# Patient Record
Sex: FEMALE | Race: BLACK OR AFRICAN AMERICAN | Employment: UNEMPLOYED | URBAN - METROPOLITAN AREA
[De-identification: names, ages, dates, MRNs, and addresses within clinical notes are randomized per-mention and may not be internally consistent; named-entity substitution may affect disease eponyms.]

---

## 2017-03-31 ENCOUNTER — HOSPITAL ENCOUNTER (OUTPATIENT)
Dept: LAB | Age: 52
Discharge: HOME | End: 2017-03-31

## 2017-03-31 LAB
ALBUMIN SERPL BCP-MCNC: 3.6 G/DL (ref 3.5–5)
ALBUMIN/GLOB SERPL: 1 {RATIO} (ref 1.1–2.2)
ALP SERPL-CCNC: 117 U/L (ref 45–117)
ALT SERPL-CCNC: 17 U/L (ref 12–78)
ANION GAP BLD CALC-SCNC: 10 MMOL/L (ref 5–15)
AST SERPL W P-5'-P-CCNC: 12 U/L (ref 15–37)
BILIRUB SERPL-MCNC: 0.3 MG/DL (ref 0.2–1)
BUN SERPL-MCNC: 13 MG/DL (ref 6–20)
BUN/CREAT SERPL: 17 (ref 12–20)
CALCIUM SERPL-MCNC: 9 MG/DL (ref 8.5–10.1)
CHLORIDE SERPL-SCNC: 105 MMOL/L (ref 97–108)
CHOLEST SERPL-MCNC: 216 MG/DL
CO2 SERPL-SCNC: 27 MMOL/L (ref 21–32)
CREAT SERPL-MCNC: 0.75 MG/DL (ref 0.55–1.02)
GLOBULIN SER CALC-MCNC: 3.7 G/DL (ref 2–4)
GLUCOSE SERPL-MCNC: 105 MG/DL (ref 65–100)
HDLC SERPL-MCNC: 81 MG/DL
HDLC SERPL: 2.7 {RATIO} (ref 0–5)
LDLC SERPL CALC-MCNC: 118.2 MG/DL (ref 0–100)
LIPID PROFILE,FLP: ABNORMAL
POTASSIUM SERPL-SCNC: 4 MMOL/L (ref 3.5–5.1)
PROT SERPL-MCNC: 7.3 G/DL (ref 6.4–8.2)
SODIUM SERPL-SCNC: 142 MMOL/L (ref 136–145)
TRIGL SERPL-MCNC: 84 MG/DL (ref ?–150)
VLDLC SERPL CALC-MCNC: 16.8 MG/DL

## 2017-03-31 PROCEDURE — 80053 COMPREHEN METABOLIC PANEL: CPT | Performed by: NURSE PRACTITIONER

## 2017-03-31 PROCEDURE — 80061 LIPID PANEL: CPT | Performed by: NURSE PRACTITIONER

## 2018-09-14 ENCOUNTER — HOSPITAL ENCOUNTER (OUTPATIENT)
Dept: LAB | Age: 53
Discharge: HOME | End: 2018-09-14

## 2018-09-14 LAB
ALBUMIN SERPL-MCNC: 3.4 G/DL (ref 3.5–5)
ALBUMIN/GLOB SERPL: 0.8 {RATIO} (ref 1.1–2.2)
ALP SERPL-CCNC: 118 U/L (ref 45–117)
ALT SERPL-CCNC: 19 U/L (ref 12–78)
ANION GAP SERPL CALC-SCNC: 11 MMOL/L (ref 5–15)
AST SERPL-CCNC: 18 U/L (ref 15–37)
BILIRUB SERPL-MCNC: 0.4 MG/DL (ref 0.2–1)
BUN SERPL-MCNC: 9 MG/DL (ref 6–20)
BUN/CREAT SERPL: 11 (ref 12–20)
CALCIUM SERPL-MCNC: 8.7 MG/DL (ref 8.5–10.1)
CHLORIDE SERPL-SCNC: 105 MMOL/L (ref 97–108)
CHOLEST SERPL-MCNC: 291 MG/DL
CO2 SERPL-SCNC: 26 MMOL/L (ref 21–32)
CREAT SERPL-MCNC: 0.84 MG/DL (ref 0.55–1.02)
GLOBULIN SER CALC-MCNC: 4.1 G/DL (ref 2–4)
GLUCOSE SERPL-MCNC: 112 MG/DL (ref 65–100)
HDLC SERPL-MCNC: 70 MG/DL
HDLC SERPL: 4.2 {RATIO} (ref 0–5)
LDLC SERPL CALC-MCNC: 197 MG/DL (ref 0–100)
LIPID PROFILE,FLP: ABNORMAL
POTASSIUM SERPL-SCNC: 4.1 MMOL/L (ref 3.5–5.1)
PROT SERPL-MCNC: 7.5 G/DL (ref 6.4–8.2)
SODIUM SERPL-SCNC: 142 MMOL/L (ref 136–145)
TRIGL SERPL-MCNC: 120 MG/DL (ref ?–150)
VLDLC SERPL CALC-MCNC: 24 MG/DL

## 2018-09-14 PROCEDURE — 80061 LIPID PANEL: CPT | Performed by: NURSE PRACTITIONER

## 2018-09-14 PROCEDURE — 80053 COMPREHEN METABOLIC PANEL: CPT | Performed by: NURSE PRACTITIONER

## 2018-09-14 PROCEDURE — 87389 HIV-1 AG W/HIV-1&-2 AB AG IA: CPT | Performed by: NURSE PRACTITIONER

## 2018-09-15 LAB
HIV 1+2 AB+HIV1 P24 AG SERPL QL IA: NONREACTIVE
HIV12 RESULT COMMENT, HHIVC: NORMAL

## 2018-10-18 ENCOUNTER — HOSPITAL ENCOUNTER (OUTPATIENT)
Dept: LAB | Age: 53
Discharge: HOME | End: 2018-10-18

## 2018-10-18 PROCEDURE — 88175 CYTOPATH C/V AUTO FLUID REDO: CPT | Performed by: NURSE PRACTITIONER

## 2018-10-18 PROCEDURE — 87624 HPV HI-RISK TYP POOLED RSLT: CPT | Performed by: NURSE PRACTITIONER

## 2018-10-18 PROCEDURE — 87491 CHLMYD TRACH DNA AMP PROBE: CPT | Performed by: NURSE PRACTITIONER

## 2018-10-19 LAB
C TRACH DNA SPEC QL NAA+PROBE: NEGATIVE
N GONORRHOEA DNA SPEC QL NAA+PROBE: NEGATIVE
SAMPLE TYPE: NORMAL
SERVICE CMNT-IMP: NORMAL
SPECIMEN SOURCE: NORMAL

## 2021-02-25 ENCOUNTER — VIRTUAL VISIT (OUTPATIENT)
Dept: FAMILY MEDICINE CLINIC | Age: 56
End: 2021-02-25
Payer: MEDICAID

## 2021-02-25 ENCOUNTER — TELEPHONE (OUTPATIENT)
Dept: FAMILY MEDICINE CLINIC | Age: 56
End: 2021-02-25

## 2021-02-25 DIAGNOSIS — Z11.59 ENCOUNTER FOR HEPATITIS C SCREENING TEST FOR LOW RISK PATIENT: ICD-10-CM

## 2021-02-25 DIAGNOSIS — E78.2 MIXED HYPERLIPIDEMIA: Primary | ICD-10-CM

## 2021-02-25 DIAGNOSIS — Z12.31 ENCOUNTER FOR SCREENING MAMMOGRAM FOR MALIGNANT NEOPLASM OF BREAST: ICD-10-CM

## 2021-02-25 DIAGNOSIS — I10 ESSENTIAL HYPERTENSION: ICD-10-CM

## 2021-02-25 DIAGNOSIS — R63.5 WEIGHT GAIN: ICD-10-CM

## 2021-02-25 DIAGNOSIS — Z11.59 NEED FOR HEPATITIS C SCREENING TEST: ICD-10-CM

## 2021-02-25 DIAGNOSIS — Z12.11 COLON CANCER SCREENING: ICD-10-CM

## 2021-02-25 DIAGNOSIS — E66.01 MORBID OBESITY (HCC): ICD-10-CM

## 2021-02-25 DIAGNOSIS — L91.0 KELOID OF SKIN: ICD-10-CM

## 2021-02-25 PROCEDURE — 99204 OFFICE O/P NEW MOD 45 MIN: CPT | Performed by: FAMILY MEDICINE

## 2021-02-25 NOTE — Clinical Note
Niko Sutherland,    I sent a referral to GI with Dr. Cristiano buckner. Would you mind calling the patient to give her their info to call and set up an appointment? She is not yet signed up for [facility] and I dont want her to fall through the cracks. Thanks!

## 2021-02-25 NOTE — PROGRESS NOTES
TELEMEDICINE VISIT    Consent: She and/or the health care decision maker is aware that that she may receive a bill for this telephone service, depending on her insurance coverage, and has provided verbal consent to proceed: Yes  History of Present Illness:     Chief Complaint   Patient presents with   [address] Hypertension    Weight Gain       Justyn Trotter is a 54 y.o. female was evaluated by synchronous (real-time) audio-video technology the [facility].    Establishing care. [facility] but has been a couple of yrs. Not currently taking any medications due to being unemployed. Just recently able to get plugged in for care. HTN. Previously diagnosed. 2 years since being on any medications. She is sure it is elevated as her weight and stress has increased. Keyloid on left ear. She was supposed to have it removed but that ended when her insurance issues started. She has had 5-6 surgeries and over the years it has increased. Reports significant weight gain. About 50 lbs over the past 1 year. Weighs 250lbs, ht 5'2\". Taking vitamins (Zinc, Vit C, D3, Mag). Taking daily ASA. Last pap was done in 2018. NILM and HPV negative. Last mammo was in 2018. Denies depressed mood. She does have some \"self disgust\" and thinks it is all situational. Starting exercising and she enjoys that. Reports she has had issues with taking Morphine/ anesthesia. Says it made her code. Chronic allergies.      3 most recent PHQ Screens 2/25/2021   Little interest or pleasure in doing things Not at all   Feeling down, depressed, irritable, or hopeless Not at all   Total Score PHQ 2 0        Past Medical History:   Diagnosis Date    Fibroids     High cholesterol     Hypertension     Keloid        Current Medications/Allergies (REVIEWED)     Current Outpatient Medications on File Prior to Visit   Medication Sig Dispense Refill    aspirin 81 mg chewable tablet Take 1 Tab by mouth daily. 30 Tab 0     No current facility-administered medications on file prior to visit. Allergies   Allergen Reactions    Lisinopril Cough         Review of Systems     Review of Systems   Constitutional: Negative for chills, fever and weight loss. HENT: Positive for congestion. Respiratory: Negative for cough and shortness of breath. Cardiovascular: Negative for chest pain. Skin:        +keloids   Psychiatric/Behavioral: Negative for depression. The patient is not nervous/anxious. Objective:     General: alert, cooperative, no distress   Mental  status: mental status: alert, oriented to person, place, and time, normal mood, behavior, speech, dress, motor activity, and thought processes   Resp: resp: normal effort and no respiratory distress   Neuro: neuro: no gross deficits   Skin: skin: +keloid on left ear lobe   Due to this being a TeleHealth evaluation, many elements of the physical examination are unable to be assessed. Labs reviewed:  Lab Results   Component Value Date/Time    WBC 5.6 04/25/2016 03:13 PM    HGB 12.5 04/25/2016 03:13 PM    HCT 39.3 04/25/2016 03:13 PM    PLATELET 459 36/88/2156 03:13 PM    MCV 87.7 04/25/2016 03:13 PM     Lab Results   Component Value Date/Time    Sodium 142 09/14/2018 09:48 AM    Potassium 4.1 09/14/2018 09:48 AM    Chloride 105 09/14/2018 09:48 AM    CO2 26 09/14/2018 09:48 AM    Anion gap 11 09/14/2018 09:48 AM    Glucose 112 (H) 09/14/2018 09:48 AM    BUN 9 09/14/2018 09:48 AM    Creatinine 0.84 09/14/2018 09:48 AM    BUN/Creatinine ratio 11 (L) 09/14/2018 09:48 AM    GFR est AA >60 09/14/2018 09:48 AM    GFR est non-AA >60 09/14/2018 09:48 AM    Calcium 8.7 09/14/2018 09:48 AM    Bilirubin, total 0.4 09/14/2018 09:48 AM    Alk.  phosphatase 118 (H) 09/14/2018 09:48 AM    Protein, total 7.5 09/14/2018 09:48 AM    Albumin 3.4 (L) 09/14/2018 09:48 AM    Globulin 4.1 (H) 09/14/2018 09:48 AM    A-G Ratio 0.8 (L) 09/14/2018 09:48 AM ALT (SGPT) 19 09/14/2018 09:48 AM    AST (SGOT) 18 09/14/2018 09:48 AM     Lab Results   Component Value Date/Time    Cholesterol, total 291 (H) 09/14/2018 09:48 AM    HDL Cholesterol 70 09/14/2018 09:48 AM    LDL, calculated 197 (H) 09/14/2018 09:48 AM    VLDL, calculated 24 09/14/2018 09:48 AM    Triglyceride 120 09/14/2018 09:48 AM    CHOL/HDL Ratio 4.2 09/14/2018 09:48 AM       Assessment and Plan:       ICD-10-CM ICD-9-CM    1. Mixed hyperlipidemia  E78.2 272.2 LIPID PANEL      METABOLIC PANEL, COMPREHENSIVE      CBC W/O DIFF      HEMOGLOBIN A1C WITH EAG   2. Essential hypertension  I10 401.9    3. Weight gain  K29.0 372.8 METABOLIC PANEL, COMPREHENSIVE      TSH 3RD GENERATION      HEMOGLOBIN A1C WITH EAG   4. Morbid obesity ([address]  E66.01 278.01 LIPID PANEL      METABOLIC PANEL, COMPREHENSIVE      CBC W/O DIFF      HEMOGLOBIN A1C WITH EAG   5. Encounter for screening mammogram for malignant neoplasm of breast  Z12.31 V76.12 [facility]ER MAMMO BI SCREENING INCL CAD   6. Colon cancer screening  Z12.11 V76.51 REFERRAL TO GASTROENTEROLOGY   7. Encounter for hepatitis C screening test for low risk patient  Z11.59 V73.89    8. Keloid of skin  L91.0 701.4        No recent medical care  No recent BP and pt not currently taking medications    Will start by getting in for BP check  Checking labs    Referred for mammogram  Referral placed for colonoscopy    Emailed [employer] activation link from today's encounter    Follow up in 4-6 wks for in office visit for BP check    Electronically Signed: Omero Fernandes MD  Providers location when delivering service (clinic, hospital, home): Clinic    We discussed the expected course, resolution and complications of the diagnosis(es) in detail. Medication risks, benefits, costs, interactions, and alternatives were discussed as indicated. I advised her to contact the office if her condition worsens, changes or fails to improve as anticipated.  She expressed understanding with the diagnosis(es) and plan. Patient understands that this encounter was a temporary measure, and the importance of further follow up and examination was emphasized. Patient verbalized understanding. Pursuant to the emergency declaration under the [address] [place] [address], [facility] waiver authority and the [employer] and [employer] Act, this Virtual  Visit was conducted, with patient's consent, to reduce the patient's risk of exposure to COVID-19 and provide continuity of care for an established patient. Services were provided through a video synchronous discussion virtually to substitute for in-person clinic visit.

## 2021-02-26 ENCOUNTER — TELEPHONE (OUTPATIENT)
Dept: FAMILY MEDICINE CLINIC | Age: 56
End: 2021-02-26

## 2021-02-26 NOTE — TELEPHONE ENCOUNTER
Called, no answer. [address] for appt  ===View-only below this line===  ----- Message -----  From: Joanne Lynch MD  Sent: 2/25/2021   6:02 PM EST  To: [facility]    Needs in office visit for BP check and labs. If possible, with me. Thanks!

## 2021-02-26 NOTE — TELEPHONE ENCOUNTER
Lulu Hanson    2/26/21 10:52 AM  Hello Ms. Zavala,     You were requesting an appointment with Dr. Traci Goodman. One of our staff tried to call you today to schedule this for you.     Dr. Traci Goodman has limited in-office slots available in the month of March, but I did find a slot on Thursday, March 11, 2021 at 1:15 PM for your blood pressure check and lab work.     I did schedule you for this appointment, so no one else takes it.     If you cannot make it to this appointment, please call or send a my chart message to let us know, and we will try to reschedule to a more convenient day or time for you.     Hope to see you here on 03/11/21 at 1:15 PM.     Thanks,  [facility]  [phone]    Last read by Jc Garcia at 10:58 AM on 2/26/2021.

## 2021-02-26 NOTE — PROGRESS NOTES
Updated/correct patient's Meghan Edmonde ref order in CC and routed over info to [facility] for scheduling and will call patient and give her [facility]'s phone # to call if she doesn't hear from them first per 's request    Thanks  Jimmy Restrepo S/PSR  [facility] Referral Coordinator

## 2021-03-04 ENCOUNTER — LAB ONLY (OUTPATIENT)
Dept: FAMILY MEDICINE CLINIC | Age: 56
End: 2021-03-04

## 2021-03-04 DIAGNOSIS — Z11.59 NEED FOR HEPATITIS C SCREENING TEST: ICD-10-CM

## 2021-03-04 DIAGNOSIS — R63.5 WEIGHT GAIN: ICD-10-CM

## 2021-03-04 DIAGNOSIS — E66.01 MORBID OBESITY (HCC): ICD-10-CM

## 2021-03-04 DIAGNOSIS — E78.2 MIXED HYPERLIPIDEMIA: ICD-10-CM

## 2021-03-05 ENCOUNTER — HOSPITAL ENCOUNTER (OUTPATIENT)
Dept: MAMMOGRAPHY | Age: 56
Discharge: HOME | End: 2021-03-05
Attending: FAMILY MEDICINE
Payer: MEDICAID

## 2021-03-05 DIAGNOSIS — Z12.31 ENCOUNTER FOR SCREENING MAMMOGRAM FOR MALIGNANT NEOPLASM OF BREAST: ICD-10-CM

## 2021-03-05 PROCEDURE — 77067 SCR MAMMO BI INCL CAD: CPT

## 2021-03-05 NOTE — PROGRESS NOTES
DM range A1c  LDL very elevated  Pt scheduled for in office visit 3/11 and we will review in more detail  Will send brief message via [employer]

## 2021-03-11 ENCOUNTER — OFFICE VISIT (OUTPATIENT)
Dept: FAMILY MEDICINE CLINIC | Age: 56
End: 2021-03-11
Payer: MEDICAID

## 2021-03-11 ENCOUNTER — TRANSCRIBE ORDER (OUTPATIENT)
Dept: REGISTRATION | Age: 56
End: 2021-03-11

## 2021-03-11 ENCOUNTER — HOSPITAL ENCOUNTER (OUTPATIENT)
Dept: LAB | Age: 56
Discharge: HOME | End: 2021-03-11
Payer: MEDICAID

## 2021-03-11 VITALS
BODY MASS INDEX: 53.92 KG/M2 | WEIGHT: 293 LBS | SYSTOLIC BLOOD PRESSURE: 142 MMHG | HEIGHT: 62 IN | OXYGEN SATURATION: 96 % | TEMPERATURE: 97.2 F | HEART RATE: 82 BPM | RESPIRATION RATE: 20 BRPM | DIASTOLIC BLOOD PRESSURE: 90 MMHG

## 2021-03-11 DIAGNOSIS — E78.2 MIXED HYPERLIPIDEMIA: ICD-10-CM

## 2021-03-11 DIAGNOSIS — Z01.812 PRE-PROCEDURAL LABORATORY EXAMINATIONS: Primary | ICD-10-CM

## 2021-03-11 DIAGNOSIS — E66.01 MORBID OBESITY (HCC): ICD-10-CM

## 2021-03-11 DIAGNOSIS — E11.9 NEWLY DIAGNOSED DIABETES (HCC): Primary | ICD-10-CM

## 2021-03-11 DIAGNOSIS — Z00.00 PREVENTATIVE HEALTH CARE: ICD-10-CM

## 2021-03-11 DIAGNOSIS — I10 ESSENTIAL HYPERTENSION: ICD-10-CM

## 2021-03-11 DIAGNOSIS — Z01.812 PRE-PROCEDURAL LABORATORY EXAMINATIONS: ICD-10-CM

## 2021-03-11 PROCEDURE — U0003 INFECTIOUS AGENT DETECTION BY NUCLEIC ACID (DNA OR RNA); SEVERE ACUTE RESPIRATORY SYNDROME CORONAVIRUS 2 (SARS-COV-2) (CORONAVIRUS DISEASE [COVID-19]), AMPLIFIED PROBE TECHNIQUE, MAKING USE OF HIGH THROUGHPUT TECHNOLOGIES AS DESCRIBED BY CMS-2020-01-R: HCPCS

## 2021-03-11 PROCEDURE — 99214 OFFICE O/P EST MOD 30 MIN: CPT | Performed by: FAMILY MEDICINE

## 2021-03-11 PROCEDURE — 3051F HG A1C>EQUAL 7.0%<8.0%: CPT | Performed by: FAMILY MEDICINE

## 2021-03-11 PROCEDURE — 90732 PPSV23 VACC 2 YRS+ SUBQ/IM: CPT | Performed by: FAMILY MEDICINE

## 2021-03-11 RX ORDER — ATORVASTATIN CALCIUM 40 MG/1
40 TABLET, FILM COATED ORAL DAILY
Qty: 90 TAB | Refills: 3 | Status: SHIPPED | OUTPATIENT
Start: 2021-03-11 | End: 2021-08-23 | Stop reason: SDUPTHER

## 2021-03-11 RX ORDER — HYDROCHLOROTHIAZIDE 12.5 MG/1
12.5 TABLET ORAL DAILY
Qty: 90 TAB | Refills: 1 | Status: SHIPPED | OUTPATIENT
Start: 2021-03-11 | End: 2021-08-23 | Stop reason: SDUPTHER

## 2021-03-11 RX ORDER — METFORMIN HYDROCHLORIDE 500 MG/1
500 TABLET ORAL 2 TIMES DAILY WITH MEALS
Qty: 180 TAB | Refills: 1 | Status: SHIPPED | OUTPATIENT
Start: 2021-03-11 | End: 2021-06-03 | Stop reason: SINTOL

## 2021-03-11 RX ORDER — AMLODIPINE BESYLATE 5 MG/1
5 TABLET ORAL DAILY
Qty: 90 TAB | Refills: 1 | Status: SHIPPED | OUTPATIENT
Start: 2021-03-11 | End: 2021-08-23 | Stop reason: SDUPTHER

## 2021-03-11 NOTE — PROGRESS NOTES
Chief Complaint   Patient presents with    Follow-up     Patient presents to follow up on lab results;also would like to discuss keloids on ear & hair loss; f.y.i scheduled for a colonoscopy on Monday. Vitals:    03/11/21 1326   BP: (!) 142/90   BP 1 Location: Left upper arm   BP Patient Position: Sitting   BP Cuff Size: Large adult   Pulse: 82   Resp: 20   Temp: 97.2 °F (36.2 °C)   TempSrc: Temporal   SpO2: 96%   Weight: 307 lb 6.4 oz (139.4 kg)   Height: 5' 2\" (1.575 m)       1. Have you been to the ER, urgent care clinic since your last visit? Hospitalized since your last visit? No     2. Have you seen or consulted any other health care providers outside of the [address] [place] [address] since your last visit? Include any pap smears or colon screening.  No

## 2021-03-11 NOTE — PROGRESS NOTES
History of Present Illness:     Chief Complaint   Patient presents with    Follow-up     Patient presents to follow up on lab results;also would like to discuss keloids on ear & hair loss; f.y.i scheduled for a colonoscopy on Monday. Sharmin Colunga is a 54 y.o. female     Follow up for lab results    C/o allergies. Itchy eyes, runny nose, post nasal drip. Taking Zyrtec and Nasonex. C/o hair loss. Ongoing for years now. Noticing a bald spot in the back of her head. Used to get steroid injections. Also large keloids. Previously had them removed in the past.     HTN. Elevated in the office today. C/o increased urination at night. PMH (REVIEWED):  Past Medical History:   Diagnosis Date    Fibroids     High cholesterol     Hypertension     Keloid        Current Medications/Allergies (REVIEWED):     Current Outpatient Medications on File Prior to Visit   Medication Sig Dispense Refill    aspirin 81 mg chewable tablet Take 1 Tab by mouth daily. 30 Tab 0     No current facility-administered medications on file prior to visit.         Allergies   Allergen Reactions    Lisinopril Cough    Morphine (Pf) Other (comments)         Review of Systems:     Denies fever, chills, sweats  Denies chest pain, KING, palpitations, LE edema  Denies cough, sputum production, SOB, pleuritic chest pain, wheezing  Denies n/v/d, constipation, melena, blood in stool    Objective:     Vitals:    03/11/21 1326   BP: (!) 142/90   Pulse: 82   Resp: 20   Temp: 97.2 °F (36.2 °C)   TempSrc: Temporal   SpO2: 96%   Weight: 307 lb 6.4 oz (139.4 kg)   Height: 5' 2\" (1.575 m)       Physical Exam:  General appearance - alert, well appearing, and in no distress and overweight  Chest - clear to auscultation, no wheezes, rales or rhonchi, symmetric air entry  Heart - normal rate, regular rhythm, normal S1, S2, no murmurs, rubs, clicks or gallops  Extremities - peripheral pulses normal, no pedal edema, no clubbing or cyanosis  Skin - Keloids on ear lobes, largest on the right. Recent Labs:  No results found for this or any previous visit (from the past 12 hour(s)). Assessment and Plan:       ICD-10-CM ICD-9-CM    1. Newly diagnosed diabetes ([facility]  E11.9 250.00 atorvastatin (LIPITOR) 40 mg tablet      metFORMIN (GLUCOPHAGE) 500 mg tablet      MICROALBUMIN, UR, RAND W/ MICROALB/CREAT RATIO      MICROALBUMIN, UR, RAND W/ MICROALB/CREAT RATIO   2. Morbid obesity ([facility]  E66.01 278.01    3. Mixed hyperlipidemia  E78.2 272.2 atorvastatin (LIPITOR) 40 mg tablet   4. Essential hypertension  I10 401.9 hydroCHLOROthiazide (HYDRODIURIL) 12.5 mg tablet      amLODIPine (NORVASC) 5 mg tablet   5. Preventative health care  Z00.00 V70.0 PNEUMOCOCCAL POLYSACCHARIDE VACCINE, 23-VALENT, ADULT OR IMMUNOSUPPRESSED PT DOSE,      CANCELED: INFLUENZA VIRUS VAC QUAD,SPLIT,PRESV FREE SYRINGE IM       New DM dx  A1c 7.1%  Started Metformin and statin    HLD, LDL > 190  Resumed statin    HTN  Start HCTZ 12.5 and Amlodipine 5    Counseled extensively on weight loss. Follow up: 3 mo    Aisha Canales MD    We discussed the expected course, resolution and complications of the diagnosis(es) in detail. Medication risks, benefits, costs, interactions, and alternatives were discussed as indicated. I advised her to contact the office if her condition worsens, changes or fails to improve as anticipated. She expressed understanding with the diagnosis(es) and plan.

## 2021-03-11 NOTE — PATIENT INSTRUCTIONS

## 2021-03-12 LAB — SARS-COV-2, COV2NT: NOT DETECTED

## 2021-03-13 ENCOUNTER — PATIENT MESSAGE (OUTPATIENT)
Dept: FAMILY MEDICINE CLINIC | Age: 56
End: 2021-03-13

## 2021-03-13 DIAGNOSIS — L65.8 FEMALE PATTERN HAIR LOSS: ICD-10-CM

## 2021-03-13 DIAGNOSIS — E11.9 NEWLY DIAGNOSED DIABETES (HCC): ICD-10-CM

## 2021-03-13 DIAGNOSIS — L91.0 KELOID OF SKIN: Primary | ICD-10-CM

## 2021-03-13 LAB
ALBUMIN SERPL-MCNC: 3.8 G/DL (ref 3.8–4.9)
ALBUMIN/CREAT UR: 13 MG/G CREAT (ref 0–29)
ALBUMIN/GLOB SERPL: 1.2 {RATIO} (ref 1.2–2.2)
ALP SERPL-CCNC: 119 IU/L (ref 39–117)
ALT SERPL-CCNC: 8 IU/L (ref 0–32)
AST SERPL-CCNC: 13 IU/L (ref 0–40)
BILIRUB SERPL-MCNC: 0.3 MG/DL (ref 0–1.2)
BUN SERPL-MCNC: 9 MG/DL (ref 6–24)
BUN/CREAT SERPL: 12 (ref 9–23)
CALCIUM SERPL-MCNC: 9 MG/DL (ref 8.7–10.2)
CHLORIDE SERPL-SCNC: 104 MMOL/L (ref 96–106)
CHOLEST SERPL-MCNC: 282 MG/DL (ref 100–199)
CO2 SERPL-SCNC: 24 MMOL/L (ref 20–29)
COMMENT:: ABNORMAL
CREAT SERPL-MCNC: 0.75 MG/DL (ref 0.57–1)
CREAT UR-MCNC: 151.1 MG/DL
ERYTHROCYTE [DISTWIDTH] IN BLOOD BY AUTOMATED COUNT: 14.4 % (ref 11.7–15.4)
EST. AVERAGE GLUCOSE BLD GHB EST-MCNC: 157 MG/DL
GLOBULIN SER CALC-MCNC: 3.1 G/DL (ref 1.5–4.5)
GLUCOSE SERPL-MCNC: 144 MG/DL (ref 65–99)
HBA1C MFR BLD: 7.1 % (ref 4.8–5.6)
HCT VFR BLD AUTO: 40.8 % (ref 34–46.6)
HCV AB S/CO SERPL IA: <0.1 S/CO RATIO (ref 0–0.9)
HDLC SERPL-MCNC: 61 MG/DL
HGB BLD-MCNC: 13 G/DL (ref 11.1–15.9)
IMP & REVIEW OF LAB RESULTS: NORMAL
LDLC SERPL CALC-MCNC: 198 MG/DL (ref 0–99)
Lab: NORMAL
Lab: NORMAL
MCH RBC QN AUTO: 27.6 PG (ref 26.6–33)
MCHC RBC AUTO-ENTMCNC: 31.9 G/DL (ref 31.5–35.7)
MCV RBC AUTO: 87 FL (ref 79–97)
MICROALBUMIN UR-MCNC: 19.1 UG/ML
PLATELET # BLD AUTO: 164 X10E3/UL (ref 150–450)
POTASSIUM SERPL-SCNC: 4 MMOL/L (ref 3.5–5.2)
PROT SERPL-MCNC: 6.9 G/DL (ref 6–8.5)
RBC # BLD AUTO: 4.71 X10E6/UL (ref 3.77–5.28)
SODIUM SERPL-SCNC: 140 MMOL/L (ref 134–144)
TRIGL SERPL-MCNC: 128 MG/DL (ref 0–149)
TSH SERPL DL<=0.005 MIU/L-ACNC: 5.68 UIU/ML (ref 0.45–4.5)
VLDLC SERPL CALC-MCNC: 23 MG/DL (ref 5–40)
WBC # BLD AUTO: 6.6 X10E3/UL (ref 3.4–10.8)

## 2021-03-15 ENCOUNTER — ANESTHESIA (OUTPATIENT)
Dept: ENDOSCOPY | Age: 56
End: 2021-03-15
Payer: MEDICAID

## 2021-03-15 ENCOUNTER — ANESTHESIA EVENT (OUTPATIENT)
Dept: ENDOSCOPY | Age: 56
End: 2021-03-15
Payer: MEDICAID

## 2021-03-15 ENCOUNTER — HOSPITAL ENCOUNTER (OUTPATIENT)
Age: 56
Setting detail: OUTPATIENT SURGERY
Discharge: HOME | End: 2021-03-15
Attending: INTERNAL MEDICINE | Admitting: INTERNAL MEDICINE
Payer: MEDICAID

## 2021-03-15 VITALS
RESPIRATION RATE: 17 BRPM | WEIGHT: 293 LBS | OXYGEN SATURATION: 100 % | HEIGHT: 62 IN | TEMPERATURE: 97.9 F | DIASTOLIC BLOOD PRESSURE: 86 MMHG | HEART RATE: 71 BPM | BODY MASS INDEX: 53.92 KG/M2 | SYSTOLIC BLOOD PRESSURE: 164 MMHG

## 2021-03-15 PROCEDURE — 74011250636 HC RX REV CODE- 250/636: Performed by: NURSE ANESTHETIST, CERTIFIED REGISTERED

## 2021-03-15 PROCEDURE — 76060000031 HC ANESTHESIA FIRST 0.5 HR: Performed by: INTERNAL MEDICINE

## 2021-03-15 PROCEDURE — 74011250636 HC RX REV CODE- 250/636: Performed by: INTERNAL MEDICINE

## 2021-03-15 PROCEDURE — 2709999900 HC NON-CHARGEABLE SUPPLY: Performed by: INTERNAL MEDICINE

## 2021-03-15 PROCEDURE — 76040000019: Performed by: INTERNAL MEDICINE

## 2021-03-15 PROCEDURE — 74011000250 HC RX REV CODE- 250: Performed by: NURSE ANESTHETIST, CERTIFIED REGISTERED

## 2021-03-15 RX ORDER — LANOLIN ALCOHOL/MO/W.PET/CERES
400 CREAM (GRAM) TOPICAL DAILY
COMMUNITY
End: 2021-06-08 | Stop reason: SDUPTHER

## 2021-03-15 RX ORDER — GLUCOSAMINE SULFATE 1500 MG
POWDER IN PACKET (EA) ORAL DAILY
COMMUNITY

## 2021-03-15 RX ORDER — FENTANYL CITRATE 50 UG/ML
100 INJECTION, SOLUTION INTRAMUSCULAR; INTRAVENOUS
Status: DISCONTINUED | OUTPATIENT
Start: 2021-03-15 | End: 2021-03-15 | Stop reason: HOSPADM

## 2021-03-15 RX ORDER — ASCORBIC ACID 500 MG
500 TABLET ORAL DAILY
COMMUNITY

## 2021-03-15 RX ORDER — CHOLECALCIFEROL (VITAMIN D3) 50 MCG
CAPSULE ORAL DAILY
COMMUNITY

## 2021-03-15 RX ORDER — FLUMAZENIL 0.1 MG/ML
0.2 INJECTION INTRAVENOUS
Status: DISCONTINUED | OUTPATIENT
Start: 2021-03-15 | End: 2021-03-15 | Stop reason: HOSPADM

## 2021-03-15 RX ORDER — ATROPINE SULFATE 0.1 MG/ML
0.5 INJECTION INTRAVENOUS
Status: DISCONTINUED | OUTPATIENT
Start: 2021-03-15 | End: 2021-03-15 | Stop reason: HOSPADM

## 2021-03-15 RX ORDER — LIDOCAINE HYDROCHLORIDE 20 MG/ML
INJECTION, SOLUTION EPIDURAL; INFILTRATION; INTRACAUDAL; PERINEURAL AS NEEDED
Status: DISCONTINUED | OUTPATIENT
Start: 2021-03-15 | End: 2021-03-15 | Stop reason: HOSPADM

## 2021-03-15 RX ORDER — EPINEPHRINE 0.1 MG/ML
1 INJECTION INTRACARDIAC; INTRAVENOUS
Status: DISCONTINUED | OUTPATIENT
Start: 2021-03-15 | End: 2021-03-15 | Stop reason: HOSPADM

## 2021-03-15 RX ORDER — PROPOFOL 10 MG/ML
INJECTION, EMULSION INTRAVENOUS
Status: DISCONTINUED | OUTPATIENT
Start: 2021-03-15 | End: 2021-03-15 | Stop reason: HOSPADM

## 2021-03-15 RX ORDER — MIDAZOLAM HYDROCHLORIDE 1 MG/ML
.25-5 INJECTION, SOLUTION INTRAMUSCULAR; INTRAVENOUS
Status: DISCONTINUED | OUTPATIENT
Start: 2021-03-15 | End: 2021-03-15 | Stop reason: HOSPADM

## 2021-03-15 RX ORDER — PROPOFOL 10 MG/ML
INJECTION, EMULSION INTRAVENOUS AS NEEDED
Status: DISCONTINUED | OUTPATIENT
Start: 2021-03-15 | End: 2021-03-15 | Stop reason: HOSPADM

## 2021-03-15 RX ORDER — SODIUM CHLORIDE 9 MG/ML
50 INJECTION, SOLUTION INTRAVENOUS CONTINUOUS
Status: DISCONTINUED | OUTPATIENT
Start: 2021-03-15 | End: 2021-03-15 | Stop reason: HOSPADM

## 2021-03-15 RX ORDER — NALOXONE HYDROCHLORIDE 0.4 MG/ML
0.4 INJECTION, SOLUTION INTRAMUSCULAR; INTRAVENOUS; SUBCUTANEOUS
Status: DISCONTINUED | OUTPATIENT
Start: 2021-03-15 | End: 2021-03-15 | Stop reason: HOSPADM

## 2021-03-15 RX ORDER — VITAMIN E 268 MG
400 CAPSULE ORAL DAILY
COMMUNITY
End: 2022-09-29

## 2021-03-15 RX ORDER — DEXTROMETHORPHAN/PSEUDOEPHED 2.5-7.5/.8
1.2 DROPS ORAL
Status: DISCONTINUED | OUTPATIENT
Start: 2021-03-15 | End: 2021-03-15 | Stop reason: HOSPADM

## 2021-03-15 RX ADMIN — LIDOCAINE HYDROCHLORIDE 50 MG: 20 INJECTION, SOLUTION INTRAVENOUS at 10:01

## 2021-03-15 RX ADMIN — PROPOFOL 100 MG: 10 INJECTION, EMULSION INTRAVENOUS at 10:03

## 2021-03-15 RX ADMIN — PROPOFOL 100 MCG/KG/MIN: 10 INJECTION, EMULSION INTRAVENOUS at 10:01

## 2021-03-15 RX ADMIN — SODIUM CHLORIDE 50 ML/HR: 9 INJECTION, SOLUTION INTRAVENOUS at 09:13

## 2021-03-15 RX ADMIN — PROPOFOL 30 MG: 10 INJECTION, EMULSION INTRAVENOUS at 10:05

## 2021-03-15 NOTE — ANESTHESIA POSTPROCEDURE EVALUATION
Procedure(s):  COLONOSCOPY.    MAC    Anesthesia Post Evaluation      Multimodal analgesia: multimodal analgesia not used between 6 hours prior to anesthesia start to PACU discharge  Patient location during evaluation: PACU  Patient participation: complete - patient participated  Level of consciousness: awake and alert  Pain score: 0  Pain management: adequate  Airway patency: patent  Anesthetic complications: no  Cardiovascular status: hemodynamically stable and acceptable  Respiratory status: acceptable  Hydration status: acceptable  Comments: Patient seen and evaluated; no concerns.  Post anesthesia nausea and vomiting:  none      INITIAL Post-op Vital signs:   Vitals Value Taken Time   /99 03/15/21 1030   Temp 36.6 °C (97.9 °F) 03/15/21 1020   Pulse 73 03/15/21 1034   Resp 16 03/15/21 1034   SpO2 100 % 03/15/21 1034   Vitals shown include unvalidated device data.

## 2021-03-15 NOTE — ANESTHESIA PREPROCEDURE EVALUATION
Relevant Problems   No relevant active problems       Anesthetic History     Other anesthesia complications     Comments: Slow emergence     Review of Systems / Medical History  Patient summary reviewed and nursing notes reviewed    Pulmonary          Shortness of breath         Neuro/Psych              Cardiovascular    Hypertension          Hyperlipidemia    Exercise tolerance: >4 METS  Comments: Recently restarted medications   GI/Hepatic/Renal  Within defined limits              Endo/Other    Diabetes: type 2    Morbid obesity    Comments: Newly diagnosed DM Other Findings              Physical Exam    Airway  Mallampati: II    Neck ROM: normal range of motion   Mouth opening: Normal     Cardiovascular    Rhythm: regular  Rate: normal         Dental  No notable dental hx       Pulmonary  Breath sounds clear to auscultation               Abdominal         Other Findings            Anesthetic Plan    ASA: 3  Anesthesia type: MAC            Anesthetic plan and risks discussed with: Patient      Informed consent obtained.

## 2021-03-15 NOTE — H&P
Gastroenterology Outpatient History and Physical    Patient: Justyn Trotter    Physician: Dionne Donohue MD    Vital Signs: see nursing notes     Allergies: Allergies   Allergen Reactions    Lisinopril Cough    Morphine (Pf) Other (comments)       Chief Complaint: Colon cancer screening    History of Present Illness: No symptoms; no chest pain, SOB, edema, focal weakness, numbness    Justification for Procedure: Colon cancer screening    History:  Past Medical History:   Diagnosis Date    Fibroids     High cholesterol     Hypertension     Keloid       Past Surgical History:   Procedure Laterality Date    HX MYOMECTOMY      HX SKIN BIOPSY      Keloid removal      Social History     Socioeconomic History    Marital status: SINGLE     Spouse name: Not on file    Number of children: Not on file    Years of education: Not on file    Highest education level: Not on file   Tobacco Use    Smoking status: Never Smoker    Smokeless tobacco: Never Used   Substance and Sexual Activity    Alcohol use: Yes     Frequency: Monthly or less     Drinks per session: 1 or 2     Binge frequency: Never     Comment: rarely    Drug use: Never    Sexual activity: Yes     Partners: Male     Birth control/protection: None      Family History   Problem Relation Age of Onset    Diabetes Mother     Hypertension Mother     Elevated Lipids Mother     Diabetes Father     Hypertension Father     Heart Failure Father     Breast Cancer Maternal Aunt        Medications:   Prior to Admission medications    Medication Sig Start Date End Date Taking? Authorizing Provider   hydroCHLOROthiazide (HYDRODIURIL) 12.5 mg tablet Take 1 Tab by mouth daily. 3/11/21   Marbin Arthur MD   amLODIPine (NORVASC) 5 mg tablet Take 1 Tab by mouth daily. 3/11/21   Marbin Arthur MD   atorvastatin (LIPITOR) 40 mg tablet Take 1 Tab by mouth daily.  3/11/21   Marbin Arthur MD   metFORMIN (GLUCOPHAGE) 500 mg tablet Take 1 Tab by mouth two (2) times daily (with meals). 3/11/21   Mikayla Reyes MD   aspirin 81 mg chewable tablet Take 1 Tab by mouth daily. 4/27/16   Socorro Lara MD       Physical Exam:   General: alert, cooperative, no distress   HEENT: Head: Normal, normocephalic, atraumatic. Heart: regular rate and rhythm   Lungs: chest clear, no wheezing, rales, normal symmetric air entry   Abdominal: Bowel sounds are normal, liver is not enlarged, spleen is not enlarged           Findings/Diagnosis: Colon cancer screening    Plan of Care/Planned Procedure: I've discussed colonoscopy possible biopsy, polypectomy, cautery, injection, alternatives, complications including but not limited to pain, cardiopulmonary event, bleeding, perforation requiring additional blood transfusion or operative repair; all questions answered.

## 2021-03-15 NOTE — PERIOP NOTES
Report from [facility], CRNA, see anesthesia record. ABD remains soft and non-tender post procedure. Pt has no complaints at this time and tolerated the procedure well. Endoscope was pre-cleaned at bedside immediately following procedure by Dayna.

## 2021-03-15 NOTE — TELEPHONE ENCOUNTER
Hussein Perkins 3/15/2021 8:50 AM EDT    Forwarding referral request to Dr. Clarence Pleitez. ----- Message -----  From: Milady Morel  Sent: 3/13/2021 9:02 PM EDT  To: PANFILO Nurse  Subject: Referral Request     Dr. Vince Flores. at [facility]: [address] [place] [address], [facility] Final [phone] was the Plastic Surgeon that removed my Keloids. I need a dermatologist to consult with for my hair loss. Frandy Skelton MD at [address] [address] [facility]: [facility], [address] was the last one I saw.

## 2021-03-15 NOTE — DISCHARGE INSTRUCTIONS
Anu Alcala  [id number]  1965    DISCHARGE INSTRUCTIONS  Discomfort:  Redness at IV site- apply warm compress to area; if redness or soreness persist- contact your physician. There may be a slight amount of blood passed from the rectum. Gaseous discomfort - walking, belching will help relieve any discomfort. You may not operate a vehicle for 12 hours. You may not engage in an occupation involving machinery or appliances for rest of today. You may not drink alcoholic beverages for at least 12 hours. Avoid making any critical decisions for at least 24 hours. DIET:   High fiber diet. - however -  remember your colon is empty and a heavy meal will produce gas. Avoid these foods:  vegetables, fried / greasy foods, carbonated drinks for today. Medications:                Resume usual medications today   ACTIVITY:  You may resume your normal daily activities it is recommended that you spend the remainder of the day resting -  avoid any strenuous activity. CALL M.D. ANY SIGN OF:   Increasing pain, nausea, vomiting  Abdominal distension (swelling)  New increased bleeding (oral or rectal)  Fever (chills)  Pain in chest area  Bloody discharge from nose or mouth  Shortness of breath     Follow-up Instructions:  Call Dr. Chrystal Mars if you have any questions or problems.  -        DISCHARGE SUMMARY from Nurse    The following personal items collected during your admission are returned to you:   Dental Appliance: Dental Appliances: None  Vision: Visual Aid: Glasses  Hearing Aid:    Jewelry:    Clothing:    Other Valuables:    Valuables sent to safe:        Patient Education        Learning About Diverticulosis and Diverticulitis  What are diverticulosis and diverticulitis? In diverticulosis and diverticulitis, pouches called diverticula form in the wall of the large intestine, or colon. · In diverticulosis, the pouches do not cause any pain or other symptoms.   · In diverticulitis, the pouches get inflamed or infected and cause symptoms. Doctors aren't sure what causes these pouches in the colon. But they think that a low-fiber diet may play a role. Without fiber to add bulk to the stool, the colon has to work harder than normal to push the stool forward. The pressure from this may cause pouches to form in weak spots along the colon. Some people with diverticulosis get diverticulitis. But experts don't know why this happens. What are the symptoms? · In diverticulosis, most people don't have symptoms. But pouches sometimes bleed. · In diverticulitis, symptoms may last from a few hours to a week or more. They include:  ? Belly pain. This is usually in the lower left side. It is sometimes worse when you move. This is the most common symptom. ? Fever and chills. ? Bloating and gas. ? Diarrhea or constipation. ? Nausea and sometimes vomiting.  ? Not feeling like eating. How can you prevent diverticulitis? You may be able to lower your chance of getting diverticulitis. You can do this by taking steps to prevent constipation. · Eat fruits, vegetables, beans, and whole grains every day. These foods are high in fiber. · Drink plenty of fluids. (Drink enough so that your urine is light yellow or clear like water.) If you have kidney, heart, or liver disease and have to limit fluids, talk with your doctor before you increase the amount of fluids you drink. · Get at least 30 minutes of exercise on most days of the week. Walking is a good choice. You also may want to do other activities, such as running, swimming, cycling, or playing tennis or team sports. · Take a fiber supplement, such as Citrucel or Metamucil, every day if needed. Read and follow all instructions on the label. · Schedule time each day for a bowel movement. Having a daily routine may help. Take your time and do not strain when having a bowel movement. Some people avoid nuts, seeds, berries, and popcorn.  They believe that these foods might get trapped in the diverticula and cause pain. But there is no proof that these foods cause diverticulitis or make it worse. How are these problems treated? · The best way to treat diverticulosis is to avoid constipation. · Treatment for diverticulitis includes antibiotics. It often includes a change in your diet. You may need only liquids at first. Your doctor may suggest pain medicines for pain or belly cramps. In some cases, surgery may be needed. Follow-up care is a key part of your treatment and safety. Be sure to make and go to all appointments, and call your doctor if you are having problems. It's also a good idea to know your test results and keep a list of the medicines you take. Where can you learn more? Go to http://www.gray.com/  Enter V713 in the search box to learn more about \"Learning About Diverticulosis and Diverticulitis. \"  Current as of: April 15, 2020               Content Version: 12.6  © 2006-2020 [employer]. Care instructions adapted under license by [employer] (which disclaims liability or warranty for this information). If you have questions about a medical condition or this instruction, always ask your healthcare professional. [address] any warranty or liability for your use of this information.

## 2021-03-15 NOTE — PROCEDURES
[address]  [phone]      March 15, 2021    Colonoscopy Procedure Note  Cesario Prince  :  1965  [facility] Medical Record Number: [id number]    Indications:     Screening colonoscopy  PCP:  Ulises Peter MD  Anesthesia/Sedation: see nursing notes  Endoscopist:  Dr. Christiano Patel  Assistants: None  Complications:  None  Estimate Blood Loss:  None    Permit:  The indications, risks, benefits and alternatives were reviewed with the patient or their decision maker who was provided an opportunity to ask questions and all questions were answered. The specific risks of colonoscopy with conscious sedation were reviewed, including but not limited to anesthetic complication, bleeding, adverse drug reaction, missed lesion, infection, IV site reactions, and intestinal perforation which would lead to the need for surgical repair. Alternatives to colonoscopy including radiographic imaging, observation without testing, or laboratory testing were reviewed including the limitations of those alternatives. After considering the options and having all their questions answered, the patient or their decision maker provided both verbal and written consent to proceed. Procedure in Detail:  After obtaining informed consent, positioning of the patient in the left lateral decubitus position, and conduction of a pre-procedure pause or \"time out\" the endoscope was introduced into the anus and advanced to the cecum, which was identified by the ileocecal valve and appendiceal orifice. The quality of the colonic preparation was adequate. A careful inspection was made as the colonoscope was withdrawn, findings and interventions are described below.     Appendiceal orifice photographed    Findings:   no mucosal lesion appreciated      - Diverticulosis    Specimens:    none    Implants: none    Complications:   None; patient tolerated the procedure well. Estimated blood loss: none    Impression:  colonic diverticulosis    Recommendations:     - For colon cancer screening in this average-risk patient, colonoscopy may be repeated in 10 years. Thank you for entrusting me with this patient's care. Please do not hesitate to contact me with any questions or if I can be of assistance with any of your other patients' GI needs.     Signed By: Kacey Alegre MD                        March 15, 2021

## 2021-03-15 NOTE — ROUTINE PROCESS
Justyn Sergo  1965  [id number]    Situation:  Verbal report received from: Vicky Edmonds  Procedure: Procedure(s):  COLONOSCOPY    Background:    Preoperative diagnosis: COLON SCREENING  Postoperative diagnosis: Diverticulosis    :  Dr. Chani Zhang  Assistant(s): Endoscopy Technician-1: Feng Wise  Endoscopy RN-1: Jeanne Patrick RN    Specimens: * No specimens in log *  H. Pylori  no    Assessment:  Intra-procedure medications   Anesthesia gave intra-procedure sedation and medications, see anesthesia flow sheet yes    Intravenous fluids: NS@ KVO     Vital signs stable     Abdominal assessment: round and soft     Recommendation:  Discharge patient per MD order  Friend  Permission to share finding with family or friend yes    Endoscopy discharge instructions have been reviewed and given to patient. The patient verbalized understanding and acceptance of instructions. Dr. Chani Zhang discussed with patient procedure findings and next steps.

## 2021-04-07 ENCOUNTER — TELEPHONE (OUTPATIENT)
Dept: FAMILY MEDICINE CLINIC | Age: 56
End: 2021-04-07

## 2021-04-07 NOTE — TELEPHONE ENCOUNTER
LVM asking patient to call the office back. Will send Anda message letting the patient know that she should follow up in 3 months around 6/11/2021 unless she has any acute issues.  Will also send message to PCP about plastic surgeon referral.

## 2021-04-07 NOTE — TELEPHONE ENCOUNTER
Pt returned call to nurse. Relayed message of nurse Corie GARCIA to pt. Pt states that a referral is needed for Podiatrist also.

## 2021-04-07 NOTE — TELEPHONE ENCOUNTER
----- Message from [place] sent at 4/7/2021  1:26 PM EDT -----  Regarding: Dr. Lambert Bennett  General Message/Vendor Calls    Caller's first and last name:  Mellissa Balbuena      Reason for call:  Requesting a call back to find out if she needs in person or vv appt for diabetic foot eval visit. Pt received a [employer] notification to call and make appt.  Pt also needs recommendation for a plastic surgeon for a keloid removal.       Callback required yes/no and why: yes      Best contact number(s):[phone]      Details to clarify the request:  [facility]

## 2021-04-08 NOTE — TELEPHONE ENCOUNTER
Patient made aware of referrals placed. Asked patient to call the office or send a message if the referral offices do not reach out to her by next Thursday and we will schedule the appointments for her.

## 2021-04-08 NOTE — TELEPHONE ENCOUNTER
All referral orders in CC have been updated and info routed over to the offices for scheduling  Patient can always call and initiate and schedule her appt and call us back with her appt info    Thanks  Chen Titus S/PSR  [facility] Referral Coordinator

## 2021-05-11 ENCOUNTER — TELEPHONE (OUTPATIENT)
Dept: FAMILY MEDICINE CLINIC | Age: 56
End: 2021-05-11

## 2021-05-11 NOTE — TELEPHONE ENCOUNTER
' phone [phone]  I will call her office to see where she is requesting this patient to go and be seen for what and that they need to send over the referral order NO PA is required for this patient's insurance     Thanks  Fleet Oakland S/PSR  [facility] Referral Coordinator

## 2021-05-11 NOTE — TELEPHONE ENCOUNTER
Patient is all taken care of and we received her Derm note    Close enc    Thanks  Pipe Monroy S/PSR  [facility] Referral Coordinator

## 2021-05-11 NOTE — TELEPHONE ENCOUNTER
----- Message from Margene Cranker sent at 5/11/2021  9:18 AM EDT -----  Regarding: /Telephone  Patient return call    Caller's first and last name and relationship (if not the patient):self      Best contact number(s):[phone]      Whose call is being returned:the office      Details to clarify the request:Pt advised was on the phone and was disconnected while getting a referral from Dr. Dandre Novoa.       Margene Cranker

## 2021-06-03 ENCOUNTER — OFFICE VISIT (OUTPATIENT)
Dept: FAMILY MEDICINE CLINIC | Age: 56
End: 2021-06-03
Payer: MEDICAID

## 2021-06-03 VITALS
OXYGEN SATURATION: 97 % | WEIGHT: 293 LBS | BODY MASS INDEX: 53.92 KG/M2 | HEART RATE: 80 BPM | DIASTOLIC BLOOD PRESSURE: 78 MMHG | RESPIRATION RATE: 18 BRPM | HEIGHT: 62 IN | SYSTOLIC BLOOD PRESSURE: 120 MMHG

## 2021-06-03 DIAGNOSIS — I10 ESSENTIAL HYPERTENSION: ICD-10-CM

## 2021-06-03 DIAGNOSIS — E11.9 NEWLY DIAGNOSED DIABETES (HCC): Primary | ICD-10-CM

## 2021-06-03 DIAGNOSIS — K21.9 GASTROESOPHAGEAL REFLUX DISEASE, UNSPECIFIED WHETHER ESOPHAGITIS PRESENT: ICD-10-CM

## 2021-06-03 DIAGNOSIS — Z91.09 ENVIRONMENTAL ALLERGIES: ICD-10-CM

## 2021-06-03 DIAGNOSIS — E78.2 MIXED HYPERLIPIDEMIA: ICD-10-CM

## 2021-06-03 PROCEDURE — 3051F HG A1C>EQUAL 7.0%<8.0%: CPT | Performed by: FAMILY MEDICINE

## 2021-06-03 PROCEDURE — 99214 OFFICE O/P EST MOD 30 MIN: CPT | Performed by: FAMILY MEDICINE

## 2021-06-03 RX ORDER — FAMOTIDINE 10 MG/1
10 TABLET ORAL 2 TIMES DAILY
Qty: 180 TABLET | Refills: 0 | Status: SHIPPED | OUTPATIENT
Start: 2021-06-03 | End: 2021-08-18

## 2021-06-03 NOTE — PROGRESS NOTES
*ATTENTION:  This note has been created by a medical student for educational purposes only. Please do not refer to the content of this note for clinical decision-making, billing, or other purposes. Please see attending physicians note to obtain clinical information on this patient. *       Ms. China Angela is a 22-year-old female here for HTN and DM. No chest pain or pressure or headaches. Experiencing some muscle soreness in her chest from trying to exercise her arms. Has shortness of breath with strenuous activity like walking up hill. Right nose bleeds. Lots of mucus. Feels like there is fluid in the ear. Nasacort helps and then came right back up. Takes zyrtec. Pressure around right maxillary sinus. Worse with the cold season but is year round. Eyes itching. 2 keloid removals and is now getting radiation. Just having a little discoloration and swelling. Not taking blood pressure at home because looking for one that has a large enough cuff but is taking meds. Swelling is better in the morning and gets worse and she stands and wears flat shoes. Has not been trying anything. Takes metformin twice a day but experiencing awful GERD, diarrhea, and stomach pain that is worse at night. Arms up, jabs, walk a mile. But is feeling very exhausted. Sleep is broken up. All water, lemonade with splenda, gave up sodas. Not a big coffee drinker. Occasional sweat tea. Sweats are the biggest challenge. Using splenda instead of direct sugar. Trying to eat more greens and cutting down on red meat. Carbs are also a weakness: potatoes and pasta. Objective: Weight 299 lbs (up 2 lbs from March 15).  BMI 54.72 /78    Plan:   Metformin XR can be better in terms of the GI side effects

## 2021-06-03 NOTE — PROGRESS NOTES
Chief Complaint   Patient presents with    Follow Up Chronic Condition     does have swelling in her legs. denies chest pain or pressure, HAs, and blurred vision. takes amlodipine 5mg every morning. has stopped drinking sodas, has been trying to cut out sugar and exercise    Nasal Congestion     has been experiencing sinus congestion, runny nose that sometimes has blood in the mucus, and post nasal drip. previously took loratidine, would like to try something else     1. Have you been to the ER, urgent care clinic since your last visit? Hospitalized since your last visit? No    2. Have you seen or consulted any other health care providers outside of the [address] [place] [address] since your last visit? Include any pap smears or colon screening.  No

## 2021-06-03 NOTE — PROGRESS NOTES
History of Present Illness:     Chief Complaint   Patient presents with    Follow Up Chronic Condition     does have swelling in her legs. denies chest pain or pressure, HAs, and blurred vision. takes amlodipine 5mg every morning. has stopped drinking sodas, has been trying to cut out sugar and exercise    Nasal Congestion     has been experiencing sinus congestion, runny nose that sometimes has blood in the mucus, and post nasal drip. previously took loratidine, would like to try something else       Lakesha Mart is a 54 y.o. female     DM. Newly diagnosed. Started on Metformin and statin. Tolerating meds well. Taking GERD and diarrhea worse since starting. HTN. Restarted on HCTZ and Amlodipine. BP at goal in office today. Has not been checking at home due to not having a large enough cuff. C/o bilateral leg swelling; better in the AM and worse during the day. Obesity. BMI 54. Weight stable since last visit. She reports she has stopped drinking sodas and working on her diet. Right sided nose bleeds with mucus. Right sided ear fullness as well. Using Nasocort daily. Itchy eyes and now taking Zyrtec as well. Recently had 2 keloids removed and radiation for treatment as well. PMH (REVIEWED):  Past Medical History:   Diagnosis Date    Adverse effect of anesthesia     slow to wake up    Diabetes ([facility] [address]     Fibroids     High cholesterol     Hypertension     Keloid        Current Medications/Allergies (REVIEWED):     Current Outpatient Medications on File Prior to Visit   Medication Sig Dispense Refill    B.infantis-B.ani-B.long-B.bifi (Probiotic 4X) 10-15 mg TbEC Take  by mouth daily.  zinc 50 mg tab tablet Take 50 mg by mouth daily.  vitamin E (AQUA GEMS) 400 unit capsule Take 400 Units by mouth daily.  ascorbic acid, vitamin C, (Vitamin C) 500 mg tablet Take 500 mg by mouth daily.  magnesium oxide (MAG-OX) 400 mg tablet Take 400 mg by mouth daily.       cholecalciferol (Vitamin D3) 25 mcg (1,000 unit) cap Take  by mouth daily.  hydroCHLOROthiazide (HYDRODIURIL) 12.5 mg tablet Take 1 Tab by mouth daily. 90 Tab 1    amLODIPine (NORVASC) 5 mg tablet Take 1 Tab by mouth daily. 90 Tab 1    atorvastatin (LIPITOR) 40 mg tablet Take 1 Tab by mouth daily. 90 Tab 3    aspirin 81 mg chewable tablet Take 1 Tab by mouth daily. 30 Tab 0    loratadine 10 mg cap Take  by mouth daily. (Patient not taking: Reported on 6/3/2021)       No current facility-administered medications on file prior to visit. Allergies   Allergen Reactions    Lisinopril Cough    Morphine (Pf) Other (comments)         Review of Systems:     Review of Systems   Constitutional: Negative for chills and fever. Respiratory: Negative for cough and shortness of breath. Cardiovascular: Negative for chest pain and leg swelling. Gastrointestinal: Positive for diarrhea and heartburn. Negative for nausea and vomiting. Objective:     Vitals:    06/03/21 1113   BP: 120/78   Pulse: 80   Resp: 18   SpO2: 97%   Weight: 299 lb 3.2 oz (135.7 kg)   Height: 5' 2\" (1.575 m)       Physical Exam:  General appearance - alert, well appearing, and in no distress and overweight  Chest - clear to auscultation, no wheezes, rales or rhonchi, symmetric air entry  Heart - normal rate, regular rhythm, normal S1, S2, no murmurs, rubs, clicks or gallops  Extremities - peripheral pulses normal, no pedal edema, no clubbing or cyanosis    Recent Labs:  No results found for this or any previous visit (from the past 12 hour(s)). Assessment and Plan:       ICD-10-CM ICD-9-CM    1. Newly diagnosed diabetes ([facility] [address]  E11.9 250.00 HEMOGLOBIN A1C WITH EAG      LDL, DIRECT      METABOLIC PANEL, COMPREHENSIVE      HEMOGLOBIN A1C WITH EAG      LDL, DIRECT      METABOLIC PANEL, COMPREHENSIVE   2. Essential hypertension  M28 919.9 METABOLIC PANEL, COMPREHENSIVE      METABOLIC PANEL, COMPREHENSIVE   3.  Mixed hyperlipidemia  E78.2 272.2 LDL, DIRECT      METABOLIC PANEL, COMPREHENSIVE      LDL, DIRECT      METABOLIC PANEL, COMPREHENSIVE   4. Gastroesophageal reflux disease, unspecified whether esophagitis present  K21.9 530.81 famotidine (PEPCID) 10 mg tablet   5. Environmental allergies  Z91.09 V15.09 REFERRAL TO ALLERGY       DM  Repeat A1c  Pt reports diarrhea since starting the Metformin  Discussed changing Metformin to XR; pt would like to continue on current regimen for time being    HTN, well controlled    HLD  Continue statin  Will repeat LDL and CMP    GERD, start Pepcid    Environmental allergies   Refer to allergist in hopes of resuming allergy shots    Follow up: 3 months    Kizzy Maciel MD      We discussed the expected course, resolution and complications of the diagnosis(es) in detail. Medication risks, benefits, costs, interactions, and alternatives were discussed as indicated. I advised her to contact the office if her condition worsens, changes or fails to improve as anticipated. She expressed understanding with the diagnosis(es) and plan.

## 2021-06-04 LAB
ALBUMIN SERPL-MCNC: 3.9 G/DL (ref 3.8–4.9)
ALBUMIN/GLOB SERPL: 1.2 {RATIO} (ref 1.2–2.2)
ALP SERPL-CCNC: 146 IU/L (ref 48–121)
ALT SERPL-CCNC: 8 IU/L (ref 0–32)
AST SERPL-CCNC: 14 IU/L (ref 0–40)
BILIRUB SERPL-MCNC: 0.3 MG/DL (ref 0–1.2)
BUN SERPL-MCNC: 8 MG/DL (ref 6–24)
BUN/CREAT SERPL: 13 (ref 9–23)
CALCIUM SERPL-MCNC: 9.3 MG/DL (ref 8.7–10.2)
CHLORIDE SERPL-SCNC: 102 MMOL/L (ref 96–106)
CO2 SERPL-SCNC: 27 MMOL/L (ref 20–29)
CREAT SERPL-MCNC: 0.62 MG/DL (ref 0.57–1)
EST. AVERAGE GLUCOSE BLD GHB EST-MCNC: 148 MG/DL
GLOBULIN SER CALC-MCNC: 3.3 G/DL (ref 1.5–4.5)
GLUCOSE SERPL-MCNC: 116 MG/DL (ref 65–99)
HBA1C MFR BLD: 6.8 % (ref 4.8–5.6)
LDLC SERPL DIRECT ASSAY-MCNC: 118 MG/DL (ref 0–99)
POTASSIUM SERPL-SCNC: 4 MMOL/L (ref 3.5–5.2)
PROT SERPL-MCNC: 7.2 G/DL (ref 6–8.5)
SODIUM SERPL-SCNC: 142 MMOL/L (ref 134–144)

## 2021-06-04 NOTE — PROGRESS NOTES
A1c at goal  LDL improved from 198 --> 118  Continue current management  Notified patient via [employer]

## 2021-06-29 ENCOUNTER — TELEPHONE (OUTPATIENT)
Dept: FAMILY MEDICINE CLINIC | Age: 56
End: 2021-06-29

## 2021-06-29 NOTE — TELEPHONE ENCOUNTER
----- Message from Brandy Dalton sent at 6/29/2021  1:19 PM EDT -----  Regarding: Dr. Carol Connell telephone  General Message/Vendor Calls    Caller's first and last name: Pt      Reason for call: Pt is requesting a new referral, because the previous allergist  she was referred to by the office does not take medicaid.       Callback required yes/no and why: yes      Best contact number(s):[phone]      Details to clarify the request: N/A      Brandy Dalton

## 2021-06-30 NOTE — TELEPHONE ENCOUNTER
Updated Allergy referral order and routed out to 's office for scheduling    Close enc    Thanks  Valerie Lin S/PSR  [facility] Referral Coordinator

## 2021-08-18 ENCOUNTER — OFFICE VISIT (OUTPATIENT)
Dept: FAMILY MEDICINE CLINIC | Age: 56
End: 2021-08-18
Payer: MEDICAID

## 2021-08-18 VITALS
OXYGEN SATURATION: 95 % | SYSTOLIC BLOOD PRESSURE: 136 MMHG | DIASTOLIC BLOOD PRESSURE: 87 MMHG | HEART RATE: 77 BPM | TEMPERATURE: 97.1 F | WEIGHT: 293 LBS | BODY MASS INDEX: 53.92 KG/M2 | RESPIRATION RATE: 16 BRPM | HEIGHT: 62 IN

## 2021-08-18 DIAGNOSIS — M94.0 COSTOCHONDRITIS: Primary | ICD-10-CM

## 2021-08-18 DIAGNOSIS — R07.89 CHEST DISCOMFORT: ICD-10-CM

## 2021-08-18 DIAGNOSIS — E66.01 MORBID OBESITY (HCC): ICD-10-CM

## 2021-08-18 PROCEDURE — 99213 OFFICE O/P EST LOW 20 MIN: CPT | Performed by: FAMILY MEDICINE

## 2021-08-18 PROCEDURE — 93000 ELECTROCARDIOGRAM COMPLETE: CPT | Performed by: FAMILY MEDICINE

## 2021-08-18 NOTE — PROGRESS NOTES
Sary Taylor is a 64 y.o. female    Chief Complaint   Patient presents with    Chest Pain     mostly in the center and the right side, intermittent x 1 month. notices the pain mostly when she bends over and it's tender to touch       1. Have you been to the ER, urgent care clinic since your last visit? Hospitalized since your last visit? No    2. Have you seen or consulted any other health care providers outside of the [address] [place] [address] since your last visit? Include any pap smears or colon screening. No      Visit Vitals  /87 (BP 1 Location: Right upper arm, BP Patient Position: Sitting, BP Cuff Size: Large adult long)   Pulse 77   Temp 97.1 °F (36.2 °C) (Temporal)   Resp 16   Ht 5' 2\" (1.575 m)   Wt 297 lb (134.7 kg)   SpO2 95%   BMI 54.32 kg/m²           Health Maintenance Due   Topic Date Due    Foot Exam Q1  Never done    COVID-19 Vaccine (1) Never done    PAP AKA CERVICAL CYTOLOGY  10/18/2021         Medication Reconciliation completed, changes noted.   Please  Update medication list.

## 2021-08-18 NOTE — PATIENT INSTRUCTIONS
Musculoskeletal Chest Pain: Care Instructions  Your Care Instructions     Chest pain is not always a sign that something is wrong with your heart or that you have another serious problem. The doctor thinks your chest pain is caused by strained muscles or ligaments, inflamed chest cartilage, or another problem in your chest, rather than by your heart. You may need more tests to find the cause of your chest pain. Follow-up care is a key part of your treatment and safety. Be sure to make and go to all appointments, and call your doctor if you are having problems. It's also a good idea to know your test results and keep a list of the medicines you take. How can you care for yourself at home? · Take pain medicines exactly as directed. ? If the doctor gave you a prescription medicine for pain, take it as prescribed. ? If you are not taking a prescription pain medicine, ask your doctor if you can take an over-the-counter medicine. · Rest and protect the sore area. · Stop, change, or take a break from any activity that may be causing your pain or soreness. · Put ice or a cold pack on the sore area for 10 to 20 minutes at a time. Try to do this every 1 to 2 hours for the next 3 days (when you are awake) or until the swelling goes down. Put a thin cloth between the ice and your skin. · After 2 or 3 days, apply a heating pad set on low or a warm cloth to the area that hurts. Some doctors suggest that you go back and forth between hot and cold. · Do not wrap or tape your ribs for support. This may cause you to take smaller breaths, which could increase your risk of lung problems. · Mentholated creams such as Bengay or Icy Hot may soothe sore muscles. Follow the instructions on the package. · Follow your doctor's instructions for exercising. · Gentle stretching and massage may help you get better faster. Stretch slowly to the point just before pain begins, and hold the stretch for at least 15 to 30 seconds.  Do this 3 or 4 times a day. Stretch just after you have applied heat. · As your pain gets better, slowly return to your normal activities. Any increased pain may be a sign that you need to rest a while longer. When should you call for help? Call 911 anytime you think you may need emergency care. For example, call if:    · You have chest pain or pressure. This may occur with:  ? Sweating. ? Shortness of breath. ? Nausea or vomiting. ? Pain that spreads from the chest to the neck, jaw, or one or both shoulders or arms. ? Dizziness or lightheadedness. ? A fast or uneven pulse. After calling 911, chew 1 adult-strength aspirin. Wait for an ambulance. Do not try to drive yourself.     · You have sudden chest pain and shortness of breath, or you cough up blood. Call your doctor now or seek immediate medical care if:    · You have any trouble breathing.     · Your chest pain gets worse.     · Your chest pain occurs consistently with exercise and is relieved by rest.   Watch closely for changes in your health, and be sure to contact your doctor if:    · Your chest pain does not get better after 1 week. Where can you learn more? Go to http://www.avila.com/  Enter V293 in the search box to learn more about \"Musculoskeletal Chest Pain: Care Instructions. \"  Current as of: February 26, 2020               Content Version: 12.8  © 4762-5102 [employer]. Care instructions adapted under license by [employer] (which disclaims liability or warranty for this information). If you have questions about a medical condition or this instruction, always ask your healthcare professional. [place] [address] any warranty or liability for your use of this information. Costochondritis: Care Instructions  Your Care Instructions  You have chest pain because the cartilage of your rib cage is inflamed. This problem is called costochondritis.  This type of chest wall pain may last from days to weeks. It is not a heart problem. Sometimes costochondritis occurs with a cold or the flu, and other times the exact cause is not known. Follow-up care is a key part of your treatment and safety. Be sure to make and go to all appointments, and call your doctor if you are having problems. It's also a good idea to know your test results and keep a list of the medicines you take. How can you care for yourself at home? · Take medicines for pain and inflammation exactly as directed. ? If the doctor gave you a prescription medicine, take it as prescribed. ? If you are not taking a prescription pain medicine, ask your doctor if you can take an over-the-counter medicine. ? Do not take two or more pain medicines at the same time unless the doctor told you to. Many pain medicines have acetaminophen, which is Tylenol. Too much acetaminophen (Tylenol) can be harmful. · It may help to use a warm compress or heating pad (set on low) on your chest. You can also try alternating heat and ice. Put ice or a cold pack on the area for 10 to 20 minutes at a time. Put a thin cloth between the ice and your skin. · Avoid any activity that strains the chest area. As your pain gets better, you can slowly return to your normal activities. · Do not use tape, an elastic bandage, a \"rib belt,\" or anything else that restricts your chest wall motion. When should you call for help? Call 911 anytime you think you may need emergency care. For example, call if:    · You have new or different chest pain or pressure. This may occur with:  ? Sweating. ? Shortness of breath. ? Nausea or vomiting. ? Pain that spreads from the chest to the neck, jaw, or one or both shoulders or arms. ? Dizziness or lightheadedness. ? A fast or uneven pulse. After calling 911, chew 1 adult-strength aspirin. Wait for an ambulance. Do not try to drive yourself.     · You have severe trouble breathing.    Call your doctor now or seek immediate medical care if:    · You have a fever or cough.     · You have any trouble breathing.     · Your chest pain gets worse. Watch closely for changes in your health, and be sure to contact your doctor if:    · Your chest pain continues even though you are taking anti-inflammatory medicine.     · Your chest wall pain has not improved after 5 to 7 days. Where can you learn more? Go to http://www.gray.com/  Enter [id number] in the search box to learn more about \"Costochondritis: Care Instructions. \"  Current as of: February 26, 2020               Content Version: 12.8  © 2409-1319 [employer]. Care instructions adapted under license by [employer] (which disclaims liability or warranty for this information). If you have questions about a medical condition or this instruction, always ask your healthcare professional. [address] any warranty or liability for your use of this information.

## 2021-08-18 NOTE — PROGRESS NOTES
I saw and evaluated the patient, performing the key elements of the service. I discussed the findings, assessment and plan with the resident and agree with the resident's findings and plan as documented in the resident's note. Evaluated for chest pain. Pain is localized to one point at the lower sternal boarder and pain is illicited with palpation. EKG WNL. Suspect this is MSK pain. Agree with plan for NSAIDs. Counseled on diet/exercise and weight loss.

## 2021-08-18 NOTE — PROGRESS NOTES
[address] [place] [address]   Office [phone], Fax [phone]    Subjective:     Chief Complaint   Patient presents with    Chest Pain     mostly in the center and the right side, intermittent x 1 month. notices the pain mostly when she bends over and it's tender to touch     History provided by patient     HPI:  Betty Sullivan is a 64 y.o. BLACK/ female w/ PMH DM2, HTN, HLD, obesity and GERD presents for chest discomfort. Chest discomfort: intermittent, at R sternal area, \" weighted pain\", x1 month. 1st time this has happened to her. - tender to touch and when leaning over (6-7/10 when touched but normally 2-3 when not touching)  - meds: none    Exercise: about 1 month ago, pt started to do upper body exercises and riding a stationary bike (arms and 5 lb weight). - 3x/wk   Diet: not well-rounded but has been making changes. Drinks 1G water /day and some lemonade and sweet tea,     Denies injury, recent sick contacts or URI symptoms/N/V/fever/chills. States acid reflux well controlled w/ pepcid. Medication reviewed. Allergy reviewed. SocHx.    - Denies smoking/drugs, occasional alcohol use  - Occupation: unemployed - job search is discouraging her     ROS (bolded are positive):   General Negative for fever, chills, changes in weight, changes in appetite   HEENT Negative for hearing loss, earache, congestion, sore throat   CV Negative for chest pain, palpitations, edema   Respiratory Negative for cough, shortness of breath, wheezing   GI Negative for change in bowel habits, abdominal pain, black or bloody stools, nausea or vomiting    Negative for frequency, dysuria, hematuria, vaginal discharge   MSK Negative for back pain, joint pain (BL knees, chronic per pt), muscle pain   Breast Negative for breast lumps, nipple discharge, galactorrhea   Skin Negative for itching, rash, hives   Neuro Negative for dizziness, headache, confusion, weakness   Psych Negative for anxiety, depression, change in mood   Heme/lymph Negative for bleeding, bruising, pallor     Objective:   Vitals - reviewed  Visit Vitals  /87 (BP 1 Location: Right upper arm, BP Patient Position: Sitting, BP Cuff Size: Large adult long)   Pulse 77   Temp 97.1 °F (36.2 °C) (Temporal)   Resp 16   Ht 5' 2\" (1.575 m)   Wt 297 lb (134.7 kg)   LMP 02/25/2016   SpO2 95%   BMI 54.32 kg/m²        Physical exam:   GEN: NAD. Alert. Well nourished. EYES:  Conjunctiva clear  NECK:  Supple; no masses; thyroid normal           LUNGS: Respirations unlabored; CTAB. no wheeze, rales, rhonchi   CARDIOVASCULAR: Regular, rate, and rhythm without murmurs, gallops or rubs   ABDOMEN: Soft; NT, ND. +bowel sounds; no rebound tenderness, no guarding. no masses or organomegaly  MSK: TTP at R sternal edge  EXT: Well perfused. No edema. No erythema. PSYCH: appropriate mood and affect. Good insight and judgement. Cooperative. SKIN: No obvious rashes or lesions. Pertinent Labs/Studies:   - POC EKG: rate 57, snr, TN and QRS intervals normal, Axis normal (~55-60)    Assessment and orders:   64 y.o. BLACK/ female w/ PMH DM2, HTN, HLD, obesity and GERD presents for chest discomfort. Costochondritis: likely source of chest discomfort given exam and history, unlikely cardiac as POC EKG unremarkable. Last ECHO 4/2016 w/ EF 55-60 %. Last EKG okay. - POC EKG (reviewed w/ Dr. Kalen Pike): rate 57, snr, TN and QRS intervals normal, Axis normal (~55-60), unremarkable compared w/ prior EKG. - Advised to alternate OTC NSAIDs and tylenol, warm compresses  - Advised to pause on arm exercises for now    Obesity: Body mass index is 54.32 kg/m². - Counseled extensively on well-rounded diet and exercise  - Positive reinforcement given on small changes she has been making   - Pt also interested in [facility] - name added to roster       Pt seen and discussed with Dr Kalen Pike (attending physician).     I have reviewed patient medical and social history and medications. I have reviewed pertinent labs results and other data. I have discussed the diagnosis with the patient and the intended plan as seen in the above orders. The patient has received an after-visit summary and questions were answered concerning future plans. I have discussed medication side effects and warnings with the patient as well.     Maryjean Moritz, MD  Resident [facility]  08/18/21

## 2021-08-23 ENCOUNTER — PATIENT MESSAGE (OUTPATIENT)
Dept: FAMILY MEDICINE CLINIC | Age: 56
End: 2021-08-23

## 2021-08-23 DIAGNOSIS — E11.9 NEWLY DIAGNOSED DIABETES (HCC): Primary | ICD-10-CM

## 2021-08-23 DIAGNOSIS — I10 ESSENTIAL HYPERTENSION: ICD-10-CM

## 2021-08-23 DIAGNOSIS — E78.2 MIXED HYPERLIPIDEMIA: ICD-10-CM

## 2021-08-23 DIAGNOSIS — E11.9 NEWLY DIAGNOSED DIABETES (HCC): ICD-10-CM

## 2021-08-24 RX ORDER — ATORVASTATIN CALCIUM 40 MG/1
40 TABLET, FILM COATED ORAL DAILY
Qty: 90 TABLET | Refills: 3 | Status: SHIPPED | OUTPATIENT
Start: 2021-08-24 | End: 2022-09-06 | Stop reason: SDUPTHER

## 2021-08-24 RX ORDER — METFORMIN HYDROCHLORIDE 500 MG/1
500 TABLET ORAL 2 TIMES DAILY WITH MEALS
Qty: 180 TABLET | Refills: 3 | Status: SHIPPED | OUTPATIENT
Start: 2021-08-24 | End: 2021-10-28

## 2021-08-24 RX ORDER — HYDROCHLOROTHIAZIDE 12.5 MG/1
12.5 TABLET ORAL DAILY
Qty: 90 TABLET | Refills: 1 | Status: SHIPPED | OUTPATIENT
Start: 2021-08-24 | End: 2022-03-03

## 2021-08-24 RX ORDER — AMLODIPINE BESYLATE 5 MG/1
5 TABLET ORAL DAILY
Qty: 90 TABLET | Refills: 1 | Status: SHIPPED | OUTPATIENT
Start: 2021-08-24 | End: 2022-03-03

## 2021-08-24 NOTE — TELEPHONE ENCOUNTER
From: Karla Bernard  To: Morales Stauffer MD  Sent: 8/23/2021 1:08 PM EDT  Subject: Prescription Question    Metformin was not listed on my medication list on the [facility] portal. Therefore, I am not able to request a refill. I'm currently on my last bottle. Please advise if I will need to refill this prescription. Thank you.     Karla Bernard
5

## 2021-10-13 ENCOUNTER — FACE TO FACE ENCOUNTER (OUTPATIENT)
Dept: FAMILY MEDICINE CLINIC | Age: 56
End: 2021-10-13

## 2021-10-13 DIAGNOSIS — E66.01 MORBID OBESITY WITH BMI OF 50.0-59.9, ADULT (HCC): Primary | ICD-10-CM

## 2021-10-14 VITALS
SYSTOLIC BLOOD PRESSURE: 143 MMHG | HEIGHT: 62 IN | BODY MASS INDEX: 53.92 KG/M2 | DIASTOLIC BLOOD PRESSURE: 97 MMHG | WEIGHT: 293 LBS

## 2021-10-14 PROBLEM — E66.01 MORBID OBESITY WITH BMI OF 50.0-59.9, ADULT (HCC): Status: ACTIVE | Noted: 2021-10-14

## 2021-10-14 PROBLEM — R60.0 LOWER LEG EDEMA: Status: ACTIVE | Noted: 2021-10-14

## 2021-10-14 NOTE — PROGRESS NOTES
[address] [facility] Medicine Weight Loss Program Intake Form    Meeting Date: 10/13/2021    Kristan Olivera  1965  PCP: Benji Mazariegos MD    Visit Vitals  BP (!) 143/97   Ht 5' 2\" (1.575 m) Comment: Patient Reported   Wt 293 lb (132.9 kg)   LMP 02/25/2016   BMI 53.59 kg/m²     Highest Adult Weight: 307 lbs  Desired Weight: 150 lbs    1. What are you hoping this program can do for you? Improve Health/Feel Better, Lose Weight, Reverse Medical Problems, Increase Energy/Allow Me To Do More Activities, Prevent Medical Problems    2. What methods of weight loss have you tried before? Exercise, Fastin Pills, Cutting back on portions    3. How often do you participate in intentional physical activity per week? 1-2 days    4. Current Medical Conditions:  Patient Active Problem List   Diagnosis Code    Left-sided Bell's palsy G51.0    On statin therapy Z79.899    Stroke risk Z91.89    Type 2 diabetes mellitus without complication, without long-term current use of insulin ([facility] [address] E11.9    Hypertension I10    High cholesterol E78.00    Lower leg edema R60.0    Morbid obesity with BMI of 50.0-59.9, adult (HCC) E66.01, Z68.43       5. What Factors do you feel have contributed to your weight? poor food choices, stress-related eating, family history of obesity, comfort food dependency, lack of exercise and late night snacking    6. I feel confident in my ability to lose weight: 4 - Highly Confident    7. I have the knowledge and tools I need to lose weight? 2 - Slightly True    8. I feel I have the support to continue my weight loss journey? 3- Somewhate    9.  Additional Information:  10. Goals and Attitude - Readiness Assessment Score: 22

## 2021-10-26 ENCOUNTER — CLINICAL SUPPORT (OUTPATIENT)
Dept: FAMILY MEDICINE CLINIC | Age: 56
End: 2021-10-26
Payer: MEDICAID

## 2021-10-26 ENCOUNTER — TELEPHONE (OUTPATIENT)
Dept: FAMILY MEDICINE CLINIC | Age: 56
End: 2021-10-26

## 2021-10-26 VITALS
TEMPERATURE: 97.1 F | BODY MASS INDEX: 53.92 KG/M2 | HEIGHT: 62 IN | WEIGHT: 293 LBS | SYSTOLIC BLOOD PRESSURE: 154 MMHG | HEART RATE: 74 BPM | DIASTOLIC BLOOD PRESSURE: 82 MMHG | OXYGEN SATURATION: 98 % | RESPIRATION RATE: 17 BRPM

## 2021-10-26 DIAGNOSIS — Z23 ENCOUNTER FOR IMMUNIZATION: Primary | ICD-10-CM

## 2021-10-26 PROCEDURE — 90686 IIV4 VACC NO PRSV 0.5 ML IM: CPT | Performed by: FAMILY MEDICINE

## 2021-10-26 PROCEDURE — 90471 IMMUNIZATION ADMIN: CPT | Performed by: FAMILY MEDICINE

## 2021-10-26 NOTE — PROGRESS NOTES
Chief Complaint   Patient presents with    Immunization/Injection     Flu     1. Have you been to the ER, urgent care clinic since your last visit? Hospitalized since your last visit? No    2. Have you seen or consulted any other health care providers outside of the [address] [place] [address] since your last visit? Include any pap smears or colon screening.  No    Visit Vitals  BP (!) 154/82   Pulse 74   Temp 97.1 °F (36.2 °C) (Temporal)   Resp 17   Ht 5' 2\" (1.575 m)   Wt 293 lb (132.9 kg)   SpO2 98%   BMI 53.59 kg/m²

## 2021-10-28 ENCOUNTER — OFFICE VISIT (OUTPATIENT)
Dept: FAMILY MEDICINE CLINIC | Age: 56
End: 2021-10-28
Payer: MEDICAID

## 2021-10-28 VITALS
HEIGHT: 62 IN | BODY MASS INDEX: 53.92 KG/M2 | OXYGEN SATURATION: 97 % | RESPIRATION RATE: 18 BRPM | SYSTOLIC BLOOD PRESSURE: 135 MMHG | HEART RATE: 74 BPM | WEIGHT: 293 LBS | DIASTOLIC BLOOD PRESSURE: 83 MMHG

## 2021-10-28 DIAGNOSIS — R35.1 NOCTURIA: ICD-10-CM

## 2021-10-28 DIAGNOSIS — M17.0 OSTEOARTHRITIS OF BOTH KNEES, UNSPECIFIED OSTEOARTHRITIS TYPE: ICD-10-CM

## 2021-10-28 DIAGNOSIS — E66.01 MORBID OBESITY WITH BMI OF 50.0-59.9, ADULT (HCC): Primary | ICD-10-CM

## 2021-10-28 DIAGNOSIS — E11.9 TYPE 2 DIABETES MELLITUS WITHOUT COMPLICATION, WITHOUT LONG-TERM CURRENT USE OF INSULIN (HCC): ICD-10-CM

## 2021-10-28 PROCEDURE — 99214 OFFICE O/P EST MOD 30 MIN: CPT | Performed by: STUDENT IN AN ORGANIZED HEALTH CARE EDUCATION/TRAINING PROGRAM

## 2021-10-28 RX ORDER — CETIRIZINE HCL 10 MG
TABLET ORAL
COMMUNITY
End: 2022-09-29

## 2021-10-28 RX ORDER — METFORMIN HYDROCHLORIDE 500 MG/1
500 TABLET, EXTENDED RELEASE ORAL
Qty: 90 TABLET | Refills: 3 | Status: SHIPPED | OUTPATIENT
Start: 2021-10-28

## 2021-10-28 NOTE — ASSESSMENT & PLAN NOTE
Likely 2/2 increased water intake in s/o HCTZ and drinking before bed  Reduce water intake  Don't drink before bed  Reassess on next visit  No concern for UTI at this time or uncontrolled T2DM

## 2021-10-28 NOTE — PROGRESS NOTES
Identified Patient with two Patient identifiers (Name and ). Two Patient Identifiers confirmed. Reviewed record in preparation for visit and have obtained necessary documentation. Chief Complaint   Patient presents with    Weight Management     weight loss clinic       Visit Vitals  /83 (BP 1 Location: Right arm, BP Patient Position: Sitting, BP Cuff Size: Large adult long)   Pulse 74   Resp 18   Ht 5' 2\" (1.575 m)   Wt 293 lb (132.9 kg)   SpO2 97%   BMI 53.59 kg/m²       1. Have you been to the ER, urgent care clinic since your last visit? Hospitalized since your last visit? No    2. Have you seen or consulted any other health care providers outside of the [address] [place] [address] since your last visit? Include any pap smears or colon screening.  No

## 2021-10-28 NOTE — ASSESSMENT & PLAN NOTE
Very mild  Slightly worse given recent increase in activity  Not needing pain meds  Reduce impact exercise such as walking outside  Increase low impact knee safe activity as discussed  Consider swimming  Continue strength exercises

## 2021-10-28 NOTE — PROGRESS NOTES
[address] [facility] [address] [place] [address]   Office [phone], Fax [phone]      Chief Complaint:     Chief Complaint   Patient presents with    Weight Management     weight loss clinic       Savannah Ruiz is a 64 y.o. female that presents for: Weight Loss and Chronic Conditions      Assessment/Plan:   I personally reviewed the following Pertinent Labs/Studies:   - Encounter Notes from 2021, Labs from 2021. Diagnoses and all orders for this visit:    1. Morbid obesity with BMI of 50.0-59.9, adult ([facility] [address]  Assessment & Plan:  - Counseled on diet, exercise, and weight loss  - 2-4 lbs weight loss per month  - Initial target weight in 6 months: (5-10%) 275lbs  - Pt Personal Goals: Starting using pinky and counting calories, Start using snack allocation based on calories using container for the day, reduce calorie/size weekly, never snack more. Stop drinking before bed and reduce water consumption slightly  - Principal problem currently is with cravings, may consider Wellbutrin or contrave in future for cravings, however does not want to start any other meds currently  - Weekly weight and sleep log  - Keep a meal diary  - Counseled on risks vs benefits of weight loss medications and their SE's          2. Type 2 diabetes mellitus without complication, without long-term current use of insulin (HCC)  Assessment & Plan:  Partial compliance  Since always remembers to take AM dose will change to ER  Repeat labs today    Orders:  -     metFORMIN ER (GLUCOPHAGE XR) 500 mg tablet; Take 1 Tablet by mouth daily (with dinner). -     HEMOGLOBIN A1C WITH EAG; Future  -     MICROALBUMIN, UR, RAND W/ MICROALB/CREAT RATIO; Future    3. Nocturia  Assessment & Plan:  Likely 2/2 increased water intake in s/o HCTZ and drinking before bed  Reduce water intake  Don't drink before bed  Reassess on next visit  No concern for UTI at this time or uncontrolled T2DM      4.  Osteoarthritis of both knees, unspecified osteoarthritis type  Assessment & Plan:  Very mild  Slightly worse given recent increase in activity  Not needing pain meds  Reduce impact exercise such as walking outside  Increase low impact knee safe activity as discussed  Consider swimming  Continue strength exercises           Follow up: Follow-up and Dispositions    · Return in about 1 month (around 11/28/2021) for Weight Loss and Chronic Conditions. Subjective:   HPI:  Justyn Trotter is a 64 y.o. female that presents for:    Obesity/Weight Loss:  - Pt is enrolled in [facility] weight loss program  - Comorbidities: HTN, T2DM, HLD, OA, and Venous insufficiency  - Weight loss medications/duration: Metformin  - Initial Weight: 293lbs  - Initial BMI: 53.59    Today's BMI: Body mass index is 53.59 kg/m². Weight Trend:   Last 3 Recorded Weights in this Encounter    10/28/21 0829   Weight: 293 lb (132.9 kg)     Weight loss plan during this past inteval: Increasing exercise  Weight loss barriers: Cravings/Snacks, Large meals with large amounts of carbs, and lack of good sleep, and idle at home all day  Diet: Heavy in carbs, binge eating snacks  Drinks: Mostly water or zero calorie drinks  Water Intake: 64 oz or more daily  Snacks: binging snacks, donuts, ice cream, cake  Sleep: Poor, waking up to urinate at least 5 times a night  Exercise: Excellent, both cardio and strength training. Knees have been hurting mildly after walking on sidewalk and sometimes on stationary bike. About an 1 and a half daily. Using fit bit to track calories burned. Summary: Overall increased exercise but is having some knee pain, especially with walking. This has not led to weight loss, however no weight gain either. Progress is limited by cravings that lead to binge eating snacks. Sleep is also an issue. She takes HCTZ for HTN, but is taking it in the AM. Issue is with drinking before bed in s/o HCTZ. Also, concern for compliance with Metformin.  She forgets to take PM dose sometimes. Not drinking coffee in PM.       T2DM:  - Partial complaince with metformin 500 mg  - Discussed extended release  - Last A1c 6.8 on 6/3/21  - Last labs showed no concern for proteinuria or NAFLD    Nocturia:  - Occurred since increasing water intake to at least 64 oz a day  - In s/o HTN and T2DM  - Drinks before bedtime  - Takes HCTZ in AM  - Does not drink coffee in PM  - Denies polydipsia, changes in vision, dysuria, fever/chills, and flank pain      ROS:   Review of Systems   Constitutional: Negative for chills and fever. Eyes: Negative for blurred vision. Respiratory: Negative for shortness of breath. Cardiovascular: Negative for chest pain. Gastrointestinal: Negative for abdominal pain, constipation, diarrhea, nausea and vomiting. Genitourinary: Negative for dysuria, flank pain and frequency. Neurological: Negative for dizziness and headaches. Endo/Heme/Allergies: Negative for polydipsia. Psychiatric/Behavioral: Negative for depression. Past medical history, social history, and medications personally reviewed. Past Medical History:   Diagnosis Date    Adverse effect of anesthesia     slow to wake up    Diabetes ([address]     Fibroids     High cholesterol     Hypertension     Keloid         Allergies personally reviewed. Allergies   Allergen Reactions    Lisinopril Cough    Morphine (Pf) Other (comments)          Objective:   Vitals reviewed. Visit Vitals  /83 (BP 1 Location: Right arm, BP Patient Position: Sitting, BP Cuff Size: Large adult long)   Pulse 74   Resp 18   Ht 5' 2\" (1.575 m)   Wt 293 lb (132.9 kg)   LMP 02/25/2016   SpO2 97%   BMI 53.59 kg/m²        Physical Exam    Vitals Reviewed. General AO x 3. No distress. Not diaphoretic. No jaundice. No cyanosis. No pallor. Cardio Normal rate, regular rhythm. Pulmonary Effort normal. No accessory muscle use. Shankar Mejia Neurological CN II-XII grossly intact. No focal deficits.    Skin No rash. Pt was discussed with Dr Abhilash Blue (attending physician). I have reviewed pertinent labs results and other data. I have discussed the diagnosis with the patient and the intended plan as seen in the above orders. The patient has received an after-visit summary and questions were answered concerning future plans. I have discussed medication side effects and warnings with the patient as well.     Donna Lu MD  Resident [facility]  10/28/21

## 2021-10-28 NOTE — ASSESSMENT & PLAN NOTE
- Counseled on diet, exercise, and weight loss  - 2-4 lbs weight loss per month  - Initial target weight in 6 months: (5-10%) 275lbs  - Pt Personal Goals: Starting using pinky and counting calories, Start using snack allocation based on calories using container for the day, reduce calorie/size weekly, never snack more.  Stop drinking before bed and reduce water consumption slightly  - Principal problem currently is with cravings, may consider Wellbutrin or contrave in future for cravings, however does not want to start any other meds currently  - Weekly weight and sleep log  - Keep a meal diary  - Counseled on risks vs benefits of weight loss medications and their SE's

## 2021-10-29 ENCOUNTER — PATIENT MESSAGE (OUTPATIENT)
Dept: FAMILY MEDICINE CLINIC | Age: 56
End: 2021-10-29

## 2021-10-29 LAB
ALBUMIN/CREAT UR: 8 MG/G CREAT (ref 0–29)
CREAT UR-MCNC: 130.7 MG/DL
EST. AVERAGE GLUCOSE BLD GHB EST-MCNC: 148 MG/DL
HBA1C MFR BLD: 6.8 % (ref 4.8–5.6)
MICROALBUMIN UR-MCNC: 10.5 UG/ML

## 2021-12-16 ENCOUNTER — TELEPHONE (OUTPATIENT)
Dept: FAMILY MEDICINE CLINIC | Age: 56
End: 2021-12-16

## 2021-12-16 NOTE — TELEPHONE ENCOUNTER
Hi! Good Afternoon  Pt.  Called to wanted to remind and update for Dr. Isa Jorgensen to fill out about her faxed paperwork from [address], Incontinence and bp that was sent last 2 days ago.    Wendy Leone

## 2022-03-19 PROBLEM — E66.01 MORBID OBESITY WITH BMI OF 50.0-59.9, ADULT (HCC): Status: ACTIVE | Noted: 2021-10-14

## 2022-03-19 PROBLEM — M17.0 OSTEOARTHRITIS OF BOTH KNEES: Status: ACTIVE | Noted: 2021-10-28

## 2022-03-19 PROBLEM — R35.1 NOCTURIA: Status: ACTIVE | Noted: 2021-10-28

## 2022-03-20 PROBLEM — R60.0 LOWER LEG EDEMA: Status: ACTIVE | Noted: 2021-10-14

## 2022-03-21 ENCOUNTER — TRANSCRIBE ORDER (OUTPATIENT)
Dept: SCHEDULING | Age: 57
End: 2022-03-21

## 2022-03-21 DIAGNOSIS — Z12.31 BREAST CANCER SCREENING BY MAMMOGRAM: Primary | ICD-10-CM

## 2022-04-19 ENCOUNTER — HOSPITAL ENCOUNTER (OUTPATIENT)
Dept: MAMMOGRAPHY | Age: 57
Discharge: HOME | End: 2022-04-19
Attending: FAMILY MEDICINE
Payer: MEDICAID

## 2022-04-19 DIAGNOSIS — Z12.31 BREAST CANCER SCREENING BY MAMMOGRAM: ICD-10-CM

## 2022-04-19 PROCEDURE — 77067 SCR MAMMO BI INCL CAD: CPT

## 2022-08-05 ENCOUNTER — NURSE TRIAGE (OUTPATIENT)
Dept: OTHER | Facility: CLINIC | Age: 57
End: 2022-08-05

## 2022-08-05 NOTE — TELEPHONE ENCOUNTER
Received call from Rosalia Hong at [facility] with Red Flag Complaint. Subjective: Caller states \"arm swollen\"     Was at allergist's office today and was noted to have arm swelling and told to see PCP; Symptoms have not changed; No triage indicated;      Kaia with [facility] to schedule appt with PCP; Attention Provider: Thank you for allowing me to participate in the care of your patient. The patient was connected to triage in response to information provided to the E[facility]. Please do not respond through this encounter as the response is not directed to a shared pool. Reason for Disposition   Caller has already spoken with the PCP and has no further questions.     Protocols used: No Contact or Duplicate Contact Call-ADULT-

## 2022-08-08 ENCOUNTER — PATIENT MESSAGE (OUTPATIENT)
Dept: FAMILY MEDICINE CLINIC | Age: 57
End: 2022-08-08

## 2022-08-08 ENCOUNTER — OFFICE VISIT (OUTPATIENT)
Dept: FAMILY MEDICINE CLINIC | Age: 57
End: 2022-08-08
Payer: MEDICAID

## 2022-08-08 ENCOUNTER — TELEPHONE (OUTPATIENT)
Dept: FAMILY MEDICINE CLINIC | Age: 57
End: 2022-08-08

## 2022-08-08 VITALS
DIASTOLIC BLOOD PRESSURE: 83 MMHG | HEART RATE: 93 BPM | BODY MASS INDEX: 53.92 KG/M2 | SYSTOLIC BLOOD PRESSURE: 154 MMHG | WEIGHT: 293 LBS | RESPIRATION RATE: 14 BRPM | OXYGEN SATURATION: 98 % | HEIGHT: 62 IN | TEMPERATURE: 98.3 F

## 2022-08-08 DIAGNOSIS — R05.9 COUGH: ICD-10-CM

## 2022-08-08 DIAGNOSIS — I10 PRIMARY HYPERTENSION: ICD-10-CM

## 2022-08-08 DIAGNOSIS — J18.9 COMMUNITY ACQUIRED PNEUMONIA, UNSPECIFIED LATERALITY: Primary | ICD-10-CM

## 2022-08-08 PROCEDURE — 99214 OFFICE O/P EST MOD 30 MIN: CPT | Performed by: STUDENT IN AN ORGANIZED HEALTH CARE EDUCATION/TRAINING PROGRAM

## 2022-08-08 RX ORDER — FLUTICASONE PROPIONATE 50 MCG
SPRAY, SUSPENSION (ML) NASAL
COMMUNITY
Start: 2022-07-21

## 2022-08-08 RX ORDER — DOXYCYCLINE 100 MG/1
100 CAPSULE ORAL 2 TIMES DAILY
Qty: 14 CAPSULE | Refills: 0 | Status: SHIPPED | OUTPATIENT
Start: 2022-08-08 | End: 2022-08-15

## 2022-08-08 RX ORDER — AZELASTINE 1 MG/ML
SPRAY, METERED NASAL
COMMUNITY
Start: 2022-07-21

## 2022-08-08 RX ORDER — AMOXICILLIN 875 MG/1
875 TABLET, FILM COATED ORAL 2 TIMES DAILY
COMMUNITY
Start: 2022-08-05 | End: 2022-09-29

## 2022-08-08 RX ORDER — DOXYCYCLINE 100 MG/1
100 CAPSULE ORAL 2 TIMES DAILY
Qty: 20 CAPSULE | Refills: 0 | Status: SHIPPED | OUTPATIENT
Start: 2022-08-08 | End: 2022-08-08

## 2022-08-08 RX ORDER — LEVOCETIRIZINE DIHYDROCHLORIDE 5 MG/1
TABLET, FILM COATED ORAL DAILY
COMMUNITY
Start: 2022-07-21

## 2022-08-08 RX ORDER — MINERAL OIL
180 ENEMA (ML) RECTAL DAILY
COMMUNITY
Start: 2022-07-08

## 2022-08-08 RX ORDER — BENZONATATE 100 MG/1
100 CAPSULE ORAL
Qty: 21 CAPSULE | Refills: 0 | Status: SHIPPED | OUTPATIENT
Start: 2022-08-08 | End: 2022-08-15

## 2022-08-08 NOTE — TELEPHONE ENCOUNTER
Patient insurance company will not cover doxycycline. She needs another prescription sent over to the pharmacy that her insurance will cover.

## 2022-08-08 NOTE — PROGRESS NOTES
Sajan Washington is a 62 y.o. female    Chief Complaint   Patient presents with    Follow-up     Patient stated she is here to follow up on left arm swelling and would like to discuss infection. She is c/o coughing up green mucous and bits of blood with the mucous on going since this weekend currently on ABX for ear and sinus infection. 1. Have you been to the ER, urgent care clinic since your last visit? Hospitalized since your last visit? No  2. Have you seen or consulted any other health care providers outside of the [address] [place] [address] since your last visit? Include any pap smears or colon screening. No    Visit Vitals  BP (!) 150/88 (BP 1 Location: Right arm, BP Patient Position: Sitting, BP Cuff Size: Large adult long)   Pulse 93   Temp 98.3 °F (36.8 °C)   Resp 14   Ht 5' 2\" (1.575 m)   Wt 297 lb 12.8 oz (135.1 kg)   SpO2 98%   BMI 54.47 kg/m²     3 most recent PHQ Screens 8/8/2022   Little interest or pleasure in doing things Not at all   Feeling down, depressed, irritable, or hopeless Not at all   Total Score PHQ 2 0     Health Maintenance Due   Topic Date Due    Foot Exam Q1  Never done    COVID-19 Vaccine (3 - Booster for Pfizer series) 09/13/2021    Lipid Screen  03/04/2022    Pneumococcal 0-64 years (2 - PCV) 03/11/2022     Sajan Washington is a 62 y.o. female    Chief Complaint   Patient presents with    Follow-up     Patient stated she is here to follow up on left arm swelling and would like to discuss infection. She is c/o coughing up green mucous and bits of blood with the mucous on going since this weekend currently on ABX for ear and sinus infection. 1. Have you been to the ER, urgent care clinic since your last visit? Hospitalized since your last visit? No  2. Have you seen or consulted any other health care providers outside of the [address] [place] [address] since your last visit? Include any pap smears or colon screening.  No    Visit Vitals  BP (!) 150/88 (BP 1 Location: Right arm, BP Patient Position: Sitting, BP Cuff Size: Large adult long)   Pulse 93   Temp 98.3 °F (36.8 °C)   Resp 14   Ht 5' 2\" (1.575 m)   Wt 297 lb 12.8 oz (135.1 kg)   SpO2 98%   BMI 54.47 kg/m²     3 most recent PHQ Screens 8/8/2022   Little interest or pleasure in doing things Not at all   Feeling down, depressed, irritable, or hopeless Not at all   Total Score PHQ 2 0     Health Maintenance Due   Topic Date Due    Foot Exam Q1  Never done    COVID-19 Vaccine (3 - Booster for Pfizer series) 09/13/2021    Lipid Screen  03/04/2022    Pneumococcal 0-64 years (2 - PCV) 03/11/2022

## 2022-08-08 NOTE — PROGRESS NOTES
Subjective   Niya Ruby is an 62 y.o. female presents for follow up of sinus infection x 6 days. Saw allergist 2 days ago and was started on amoxicillin for sinus infection/ear infection. Had negative rapid test last week. Using azelastine, flonase, mucinex. Now has been coughing a lot with sputum. Is not sleeping d/t coughing all night. Review of Systems   Review of Systems   Constitutional:  Negative for chills and fever. HENT:  Positive for congestion and ear pain. Negative for sore throat. Respiratory:  Positive for cough and sputum production. Negative for shortness of breath and wheezing. Cardiovascular:  Negative for chest pain, palpitations and leg swelling. Gastrointestinal:  Negative for abdominal pain, nausea and vomiting. Skin:  Negative for rash. Allergies   Allergies   Allergen Reactions    Lisinopril Cough    Morphine (Pf) Other (comments)       Medications  Current Outpatient Medications   Medication Sig    amoxicillin (AMOXIL) 875 mg tablet Take 875 mg by mouth two (2) times a day. azelastine (ASTELIN) 137 mcg (0.1 %) nasal spray     fexofenadine (ALLEGRA) 180 mg tablet Take 180 mg by mouth in the morning. levocetirizine (XYZAL) 5 mg tablet daily. fluticasone propionate (FLONASE) 50 mcg/actuation nasal spray     benzonatate (TESSALON) 100 mg capsule Take 1 Capsule by mouth three (3) times daily as needed for Cough for up to 7 days. doxycycline (VIBRAMYCIN) 100 mg capsule Take 1 Capsule by mouth two (2) times a day for 7 days. hydroCHLOROthiazide (HYDRODIURIL) 12.5 mg tablet Take 1 tablet by mouth once daily    amLODIPine (NORVASC) 5 mg tablet Take 1 tablet by mouth once daily    metFORMIN ER (GLUCOPHAGE XR) 500 mg tablet Take 1 Tablet by mouth daily (with dinner). atorvastatin (LIPITOR) 40 mg tablet Take 1 Tablet by mouth daily. B.infantis-B.ani-B.long-B.bifi (Probiotic 4X) 10-15 mg TbEC Take  by mouth daily.     zinc 50 mg tab tablet Take 50 mg by mouth daily. ascorbic acid, vitamin C, (VITAMIN C) 500 mg tablet Take 500 mg by mouth daily. cholecalciferol (VITAMIN D3) 25 mcg (1,000 unit) cap Take  by mouth daily. aspirin 81 mg chewable tablet Take 1 Tab by mouth daily. cetirizine (ZYRTEC) 10 mg tablet Take  by mouth. (Patient not taking: Reported on 8/8/2022)    magnesium oxide (MAG-OX) 400 mg tablet Take 1 Tablet by mouth daily. (Patient not taking: No sig reported)    vitamin E (AQUA GEMS) 400 unit capsule Take 400 Units by mouth daily. (Patient not taking: Reported on 8/8/2022)     No current facility-administered medications for this visit. Medical History  Past Medical History:   Diagnosis Date    Adverse effect of anesthesia     slow to wake up    Diabetes ([facility] [address] [address]     Fibroids     High cholesterol     Hypertension     Keloid        Immunizations   Immunization History   Administered Date(s) Administered    COVID-19, PFIZER PURPLE top, DILUTE for use, (age 15 y+), IM, 30mcg/0.3mL 03/23/2021, 04/13/2021    Hep A Vaccine 08/10/2018, 03/01/2019    Hep B Vaccine 04/11/2018, 05/14/2018, 08/10/2018    Influenza Vaccine 10/01/2020    Influenza Vaccine (Quad) PF (>6 Mo Flulaval, Fluarix, and >3 Yrs Afluria, Fluzone [id number]) 10/26/2021    MMR 04/11/2018, 05/14/2018    Pneumococcal Polysaccharide (PPSV-23) 03/11/2021    TB Skin Test (PPD) 03/26/2018, 03/28/2018, 04/09/2018, 04/11/2018    Tdap 04/11/2018, 04/11/2018    Zoster Recombinant 08/10/2018, 10/22/2018       Objective   Vital Signs  Visit Vitals  BP (!) 154/83 (BP 1 Location: Left upper arm, BP Patient Position: Sitting, BP Cuff Size: Adult long)   Pulse 93   Temp 98.3 °F (36.8 °C)   Resp 14   Ht 5' 2\" (1.575 m)   Wt 297 lb 12.8 oz (135.1 kg)   LMP 02/25/2016   SpO2 98%   BMI 54.47 kg/m²     BP elevated x 2. Physical Examination  Physical Exam  Constitutional:       General: She is not in acute distress. Appearance: Normal appearance. HENT:      Head: Normocephalic and atraumatic. Eyes:      Conjunctiva/sclera: Conjunctivae normal.   Cardiovascular:      Rate and Rhythm: Normal rate and regular rhythm. Pulses: Normal pulses. Heart sounds: No murmur heard. Pulmonary:      Effort: Pulmonary effort is normal. No respiratory distress. Breath sounds: Normal breath sounds. Comments: + upper airway sounds  Abdominal:      General: There is no distension. Palpations: Abdomen is soft. Tenderness: There is no abdominal tenderness. Musculoskeletal:      Right lower leg: No edema. Left lower leg: No edema. Skin:     General: Skin is warm. Findings: No erythema. Neurological:      General: No focal deficit present. Mental Status: She is alert. CXR: Increased interstitial markings in RLL and CLAU. No focal consolidation. Assessment   Ben Taylor is a 62 y.o. who presents for concern for CAP. Plan   1. Community acquired pneumonia, unspecified laterality Cough, sputum production. CXR with increased interstitial markings. Tessalon for cough. Will switch from amoxicillin to doxycycline.  - benzonatate (TESSALON) 100 mg capsule; Take 1 Capsule by mouth three (3) times daily as needed for Cough for up to 7 days. Dispense: 21 Capsule; Refill: 0  - doxycycline (VIBRAMYCIN) 100 mg capsule; Take 1 Capsule by mouth two (2) times a day for 7 days. Dispense: 14 Capsule; Refill: 0    2. Cough See #1.  - XR CHEST PA LAT; Future  - benzonatate (TESSALON) 100 mg capsule; Take 1 Capsule by mouth three (3) times daily as needed for Cough for up to 7 days. Dispense: 21 Capsule; Refill: 0  - doxycycline (VIBRAMYCIN) 100 mg capsule; Take 1 Capsule by mouth two (2) times a day for 7 days. Dispense: 14 Capsule; Refill: 0    3. Primary hypertension BP elevated x 2 today. On HCTZ 12.5mg daily and amlodipine 5mg daily.  - Keep BP log and follow up in 2 weeks        I have discussed the aforementioned diagnoses and plan with the patient in detail.  I have provided information in person and/or in AVS. All questions answered prior to discharge.       I discussed this patient with Dr. Sarina Judd (Attending Physician)   Signed By:  Parmjit Nicholas DO    Family Medicine Resident

## 2022-08-29 ENCOUNTER — TELEPHONE (OUTPATIENT)
Dept: FAMILY MEDICINE CLINIC | Age: 57
End: 2022-08-29

## 2022-08-29 NOTE — TELEPHONE ENCOUNTER
----- Message from Roselia Christopher sent at 8/24/2022  2:44 PM EDT -----  Subject: Referral Request    Reason for referral request? A1C, BP and Cholesterol Check  Provider patient wants to be referred to(if known):     Provider Phone Number(if known): Additional Information for Provider?  Pt. would like to schedule labs for   her A1C, and wants to schedule for her BP Check and Cholesterol Check  ---------------------------------------------------------------------------  --------------  Clemente MISHRA    [phone]; OK to leave message on voicemail  ---------------------------------------------------------------------------  --------------

## 2022-08-29 NOTE — TELEPHONE ENCOUNTER
----- Message from Nanda Emanuel sent at 8/24/2022  2:45 PM EDT -----  Subject: Message to Provider    QUESTIONS  Information for Provider? Pt. sees Dr. Jazmín Patel. Wants to schedule a Pap   Smear. Thank you.  ---------------------------------------------------------------------------  --------------  Saritha MISHRA  [phone]; OK to leave message on voicemail  ---------------------------------------------------------------------------  --------------  SCRIPT ANSWERS  Relationship to Patient?  Self

## 2022-09-05 DIAGNOSIS — I10 ESSENTIAL HYPERTENSION: ICD-10-CM

## 2022-09-06 DIAGNOSIS — E11.9 NEWLY DIAGNOSED DIABETES (HCC): ICD-10-CM

## 2022-09-06 DIAGNOSIS — E78.2 MIXED HYPERLIPIDEMIA: ICD-10-CM

## 2022-09-06 RX ORDER — ATORVASTATIN CALCIUM 40 MG/1
40 TABLET, FILM COATED ORAL DAILY
Qty: 90 TABLET | Refills: 0 | Status: SHIPPED | OUTPATIENT
Start: 2022-09-06 | End: 2022-11-10 | Stop reason: SDUPTHER

## 2022-09-06 RX ORDER — AMLODIPINE BESYLATE 5 MG/1
TABLET ORAL
Qty: 90 TABLET | Refills: 0 | Status: SHIPPED | OUTPATIENT
Start: 2022-09-06

## 2022-09-29 ENCOUNTER — OFFICE VISIT (OUTPATIENT)
Dept: FAMILY MEDICINE CLINIC | Age: 57
End: 2022-09-29
Payer: MEDICAID

## 2022-09-29 VITALS
WEIGHT: 293 LBS | HEART RATE: 77 BPM | OXYGEN SATURATION: 98 % | SYSTOLIC BLOOD PRESSURE: 148 MMHG | DIASTOLIC BLOOD PRESSURE: 83 MMHG | BODY MASS INDEX: 54.73 KG/M2

## 2022-09-29 DIAGNOSIS — Z23 ENCOUNTER FOR IMMUNIZATION: ICD-10-CM

## 2022-09-29 DIAGNOSIS — E78.00 HIGH CHOLESTEROL: ICD-10-CM

## 2022-09-29 DIAGNOSIS — F43.21 ADJUSTMENT DISORDER WITH DEPRESSED MOOD: ICD-10-CM

## 2022-09-29 DIAGNOSIS — Z00.00 WELL WOMAN EXAM (NO GYNECOLOGICAL EXAM): ICD-10-CM

## 2022-09-29 DIAGNOSIS — E11.9 TYPE 2 DIABETES MELLITUS WITHOUT COMPLICATION, WITHOUT LONG-TERM CURRENT USE OF INSULIN (HCC): Primary | ICD-10-CM

## 2022-09-29 DIAGNOSIS — L91.0 KELOID: ICD-10-CM

## 2022-09-29 DIAGNOSIS — I10 PRIMARY HYPERTENSION: ICD-10-CM

## 2022-09-29 PROCEDURE — 90686 IIV4 VACC NO PRSV 0.5 ML IM: CPT

## 2022-09-29 PROCEDURE — 99214 OFFICE O/P EST MOD 30 MIN: CPT

## 2022-09-29 PROCEDURE — 99396 PREV VISIT EST AGE 40-64: CPT

## 2022-09-29 RX ORDER — MONTELUKAST SODIUM 10 MG/1
TABLET ORAL
COMMUNITY
Start: 2022-09-01

## 2022-09-29 NOTE — PROGRESS NOTES
Chief Complaint   Patient presents with    Physical    Well Woman     Visit Vitals  BP (!) 148/83 (BP 1 Location: Left upper arm, BP Patient Position: Sitting, BP Cuff Size: Large adult)   Pulse 77   Wt 299 lb 4 oz (135.7 kg)   SpO2 98%   BMI 54.73 kg/m²

## 2022-09-29 NOTE — PATIENT INSTRUCTIONS
[address]  [phone]      [facility]:  24 hour crisis line: [phone]  Daytime number: [phone]  Psychiatry, counseling, case management, drug/alcohol addiction     [address] (Dr. Deborah Suraez): [email or web address] / [email or web address]/  [address] [facility]. [address], [address]  Phone: [phone])  Fax: [phone]  Office Hours:  Mon: 10:00 am to 4:00 pm  Tue: 8:00 am to 6:00 pm  Wed-Thur: 8:00 am to 7:00 pm     [facility]: [email or web address]  [place] ([phone]),  [place] ([phone])  [place] ([phone])  [address] ([phone])     [facility]  Addiction/Substance abuse   [place]: [phone]  [place]: [address] [place] [address] [facility]: [email or web address] / [email or web address].  [address] , [facility], [address] [place] [address]  Ph. [phone] Fax [phone]     Raymond Naidu: [email or web address]  [employer] ([phone])  Katlyn Mendez ([phone])

## 2022-09-29 NOTE — PROGRESS NOTES
HPI:  Ary Hernández is a 62 y.o. female presenting for physical exam. Noted to have a past medical history of HTN, T2DM, HLD. HTN: BP elevated today 148/83. Doesn't measure BP at home. Has a cuff but says readings are not accurate. On Norvasc 5mg and HCTZ 12.5mg daily. T2DM: On Metformin XR. A1c last year controlled at 6.8. Has not been to the eye doctor in a while. Last foot exam 1 year ago. HLD: On Lipitor 40 daily. Depressed mood: Patient does report recent stressors. She has been having trouble finding stable work. Has been doing temp jobs. This has been affecting her mood. Has been eating \"whatever I want. \" Feeling very fatigued and sleeping more. Lives alone and does not have family nearby. Is frustrated bc she has a bachelor's in business and master's degree in HR but has been struggling to find work. Her friends are in different fields and have not been able to help. Not interested in starting medication at this time. Is open to counseling. Has hx keloids. Was seeing dermatologist for treatment but this doctor is out of network for her. Requesting new referral.       Immunizations - reviewed:   Immunization History   Administered Date(s) Administered    COVID-19, PFIZER PURPLE top, DILUTE for use, (age 15 y+), IM, 30mcg/0.3mL 03/23/2021, 04/13/2021, 11/04/2021    Hep A Vaccine 08/10/2018, 03/01/2019    Hep B Vaccine 04/11/2018, 05/14/2018, 08/10/2018    Influenza Vaccine 10/01/2020    Influenza, FLUARIX, FLULAVAL, Amie Bearded (age 10 mo+) AND AFLURIA, (age 1 y+), PF, 0.5mL 10/26/2021, 09/29/2022    MMR 04/11/2018, 05/14/2018    Pneumococcal Polysaccharide (PPSV-23) 03/11/2021    TB Skin Test (PPD) 03/26/2018, 03/28/2018, 04/09/2018, 04/11/2018    Tdap 04/11/2018, 04/11/2018    Zoster Recombinant 08/10/2018, 10/22/2018           Allergies- reviewed:    Allergies   Allergen Reactions    Lisinopril Cough    Morphine (Pf) Other (comments)         Medications- reviewed:   Current Outpatient Medications   Medication Sig    montelukast (SINGULAIR) 10 mg tablet TAKE 1 TABLET BY MOUTH ONCE DAILY 2 HOURS PRIOR TO ALLERGY INJECTION WITH AN ADDITIONAL DOSE OF ZYRTEC 10 MG FOR LOCAL ALLERGY INJECTION REACTIONS    pneumoc 15-evelyn conj-dip cr,PF, 0.5 mL syrg 0.5 mL by IntraMUSCular route once for 1 dose. amLODIPine (NORVASC) 5 mg tablet Take 1 tablet by mouth once daily    atorvastatin (LIPITOR) 40 mg tablet Take 1 Tablet by mouth daily. azelastine (ASTELIN) 137 mcg (0.1 %) nasal spray     fexofenadine (ALLEGRA) 180 mg tablet Take 180 mg by mouth in the morning. levocetirizine (XYZAL) 5 mg tablet daily. fluticasone propionate (FLONASE) 50 mcg/actuation nasal spray     hydroCHLOROthiazide (HYDRODIURIL) 12.5 mg tablet Take 1 tablet by mouth once daily    metFORMIN ER (GLUCOPHAGE XR) 500 mg tablet Take 1 Tablet by mouth daily (with dinner). zinc 50 mg tab tablet Take 50 mg by mouth daily. ascorbic acid, vitamin C, (VITAMIN C) 500 mg tablet Take 500 mg by mouth daily. cholecalciferol (VITAMIN D3) 25 mcg (1,000 unit) cap Take  by mouth daily. aspirin 81 mg chewable tablet Take 1 Tab by mouth daily. B.infantis-B.ani-B.long-B.bifi (Probiotic 4X) 10-15 mg TbEC Take  by mouth daily. (Patient not taking: Reported on 9/29/2022)     No current facility-administered medications for this visit.          Past Medical History- reviewed:  Past Medical History:   Diagnosis Date    Adverse effect of anesthesia     slow to wake up    Diabetes ([address]     Fibroids     High cholesterol     Hypertension     Keloid          Past Surgical History- reviewed:   Past Surgical History:   Procedure Laterality Date    COLONOSCOPY N/A 3/15/2021    COLONOSCOPY performed by Bolivar Stephens MD at [facility] ENDOSCOPY    HX BUNIONECTOMY      HX MYOMECTOMY      HX OTHER SURGICAL      cyst removed from left wrist    HX SKIN BIOPSY      Keloid removal         Family History - reviewed:  Family History   Problem Relation Age of Onset Diabetes Mother     Hypertension Mother     Elevated Lipids Mother     Diabetes Father     Hypertension Father     Heart Failure Father     Breast Cancer Maternal Aunt          Social History - reviewed:  Social History     Socioeconomic History    Marital status: SINGLE     Spouse name: Not on file    Number of children: Not on file    Years of education: Not on file    Highest education level: Not on file   Occupational History    Not on file   Tobacco Use    Smoking status: Never    Smokeless tobacco: Never   Vaping Use    Vaping Use: Never used   Substance and Sexual Activity    Alcohol use: Yes     Comment: rarely    Drug use: Never    Sexual activity: Yes     Partners: Male     Birth control/protection: None   Other Topics Concern    Not on file   Social History Narrative    Not on file     Social Determinants of Health     Financial Resource Strain: Not on file   Food Insecurity: Not on file   Transportation Needs: Not on file   Physical Activity: Not on file   Stress: Not on file   Social Connections: Not on file   Intimate Partner Violence: Not on file   Housing Stability: Not on file           Review of Systems   Constitutional:  Negative for chills and fever. HENT:  Positive for congestion. Respiratory:  Negative for shortness of breath. Cardiovascular:  Negative for chest pain, palpitations and leg swelling. Gastrointestinal:  Negative for abdominal pain, nausea and vomiting. Genitourinary:  Negative for dysuria. Psychiatric/Behavioral:  Positive for depression.           Physical Exam  Visit Vitals  BP (!) 148/83 (BP 1 Location: Left upper arm, BP Patient Position: Sitting, BP Cuff Size: Large adult)   Pulse 77   Wt 135.7 kg (299 lb 4 oz)   LMP 02/25/2016   SpO2 98%   BMI 54.73 kg/m²       General appearance - alert, well appearing, obese, and in no distress  Chest - clear to auscultation, no wheezes, rales or rhonchi, symmetric air entry  Heart - normal rate, regular rhythm, normal S1, S2, no murmurs, rubs, clicks or gallops  Abdomen - soft, nontender, nondistended, no masses or organomegaly  Neurological - alert, oriented, normal speech, no focal findings or movement disorder noted  Musculoskeletal - no joint tenderness, deformity or swelling  Extremities - no pedal edema noted  Skin - normal coloration and turgor, no rashes, no suspicious skin lesions noted       Diabetic Foot Exam:  Protective sensation is intact bilaterally. Pedal pulses are 2+ and normal bilaterally. L foot skin inspection:  normal skin and soft tissue with no gross edema or evidence of acute injury or foot ulcer  R foot skin inspection:  skin and soft tissue appear normal with no significant edema or evidence of acute injury or foot ulcer        Assessment/Plan:  1. Type 2 diabetes mellitus without complication, without long-term current use of insulin (HCC)  Previously controlled. Will recheck A1c. Diet has been worse as of late. Check microalbumin. If elevated, consider adding ACE/ARB for improved BP control. Foot exam wnl today. Advised yearly eye exams  - HEMOGLOBIN A1C WITH EAG; Future  - MICROALBUMIN, UR, RAND W/ MICROALB/CREAT RATIO; Future  -  DIABETES FOOT EXAM    2. High cholesterol  - LIPID PANEL; Future    3. Encounter for immunization  - INFLUENZA, FLUARIX, FLULAVAL, FLUZONE (AGE 6 MO+), AFLURIA(AGE 3Y+) IM, PF, 0.5 ML  - pneumoc 15-evelyn conj-dip cr,PF, 0.5 mL syrg; 0.5 mL by IntraMUSCular route once for 1 dose. Dispense: 1 Each; Refill: 0  - [place] IMMUNIZ ADMIN,1 SINGLE/COMB VAC/TOXOID    4. Keloid  - REFERRAL TO DERMATOLOGY    5. Adjustment disorder with depressed mood  Symptoms associated with difficulty finding stable work. Will have patient follow up with family stress clinic. Also provided other counseling resources. Patient not interested in medication at this time. No SI/HI. 6. Well woman exam (no gynecological exam)  Health Maintenance Reviewed-   Pap smear: NILM and HPV neg in 2018.  Next due in Oct 2023  Mammogram BIRADS-1 in 03/2022, next due in 03/2023  Colonoscopy completed in 2021: findings of colonic diverticulosis, repeat in 10 yrs  HIV testing NR in 2018  Hepatitis C testing NR in 2021  Up to date on Tdap and Shingrix  Will give flu vaccine today and send Rx for PCV15 to complete pneumococcal series     7. Hypertension  BP elevated today (140s/80s). Diet and exercise have been difficult recently. Compliant with medications. Patient also w/ significant life stress. Recommended keeping BP log and sending [employer] message within 1-2 weeks. Discussed possibly increasing current medication dose or adding new medication if BP persistently elevated. Follow-up and Dispositions    Return in about 1 week (around 10/6/2022) for family stress clinic asap . I have discussed the diagnosis with the patient and the intended plan as seen in the above orders. The patient has received an after-visit summary and questions were answered concerning future plans. I have discussed medication side effects and warnings with the patient as well. Informed pt to return to the office if new symptoms arise.     Patient discussed with Dr. Shun Loredo (attending)      Angie Link, DO

## 2022-09-29 NOTE — LETTER
Name: Ary Host   Sex: female   : 1965   [address] [place] [address] [phone] (home)     Current Immunizations:  Immunization History   Administered Date(s) Administered    COVID-19, PFIZER PURPLE top, DILUTE for use, (age 15 y+), IM, 30mcg/0.3mL 2021, 2021, 2021    Hep A Vaccine 08/10/2018, 2019    Hep B Vaccine 2018, 2018, 08/10/2018    Influenza Vaccine 10/01/2020    Influenza, FLUARIX, Kathe Gordon (age 10 mo+) AND HOLLYURIA, (age 1 y+), PF, 0.5mL 10/26/2021, 2022    MMR 2018, 2018    Pneumococcal Polysaccharide (PPSV-23) 2021    TB Skin Test (PPD) 2018, 2018, 2018, 2018    Tdap 2018, 2018    Zoster Recombinant 08/10/2018, 10/22/2018       Allergies:   Allergies as of 2022 - Fully Reviewed 2022   Allergen Reaction Noted    Lisinopril Cough 2021    Morphine (pf) Other (comments) 2021

## 2022-09-30 LAB
ALBUMIN/CREAT UR: 9 MG/G CREAT (ref 0–29)
CHOLEST SERPL-MCNC: 196 MG/DL (ref 100–199)
CREAT UR-MCNC: 164.2 MG/DL
EST. AVERAGE GLUCOSE BLD GHB EST-MCNC: 163 MG/DL
HBA1C MFR BLD: 7.3 % (ref 4.8–5.6)
HDLC SERPL-MCNC: 67 MG/DL
IMP & REVIEW OF LAB RESULTS: NORMAL
LDLC SERPL CALC-MCNC: 108 MG/DL (ref 0–99)
MICROALBUMIN UR-MCNC: 15.3 UG/ML
TRIGL SERPL-MCNC: 119 MG/DL (ref 0–149)
VLDLC SERPL CALC-MCNC: 21 MG/DL (ref 5–40)

## 2022-10-04 ENCOUNTER — NURSE TRIAGE (OUTPATIENT)
Dept: OTHER | Facility: CLINIC | Age: 57
End: 2022-10-04

## 2022-10-04 ENCOUNTER — OFFICE VISIT (OUTPATIENT)
Dept: FAMILY MEDICINE CLINIC | Age: 57
End: 2022-10-04
Payer: MEDICAID

## 2022-10-04 ENCOUNTER — PATIENT MESSAGE (OUTPATIENT)
Dept: FAMILY MEDICINE CLINIC | Age: 57
End: 2022-10-04

## 2022-10-04 VITALS
HEART RATE: 99 BPM | SYSTOLIC BLOOD PRESSURE: 144 MMHG | BODY MASS INDEX: 53.92 KG/M2 | DIASTOLIC BLOOD PRESSURE: 81 MMHG | OXYGEN SATURATION: 96 % | HEIGHT: 62 IN | RESPIRATION RATE: 19 BRPM | TEMPERATURE: 98 F | WEIGHT: 293 LBS

## 2022-10-04 DIAGNOSIS — T50.Z95A VACCINE REACTION, INITIAL ENCOUNTER: Primary | ICD-10-CM

## 2022-10-04 DIAGNOSIS — I10 ESSENTIAL HYPERTENSION: ICD-10-CM

## 2022-10-04 PROCEDURE — 99214 OFFICE O/P EST MOD 30 MIN: CPT | Performed by: STUDENT IN AN ORGANIZED HEALTH CARE EDUCATION/TRAINING PROGRAM

## 2022-10-04 RX ORDER — TRIAMCINOLONE ACETONIDE 1 MG/G
CREAM TOPICAL 2 TIMES DAILY
Qty: 45 G | Refills: 0 | Status: SHIPPED | OUTPATIENT
Start: 2022-10-04

## 2022-10-04 NOTE — TELEPHONE ENCOUNTER
Received call from Brandie Love at [facility] with Red Flag Complaint. Subjective: Caller states \"I had the COVID vaccine, and PNA vaccine and the injection site is swollen, red and itchy. Thursday is when I got the vaccines, the symptoms started then and it has progressively got worse. \"     Current Symptoms: left arm injection site, hard knot/swelling spreads down to elbow warm to touch, spreading redness, itching, left armpit lymph node swelling    Onset: 4 days ago; gradual, worsening    Associated Symptoms: reduced activity    Pain Severity: no pain, itching/irritation moderate    Temperature: Denies had a slight fever, now gone. What has been tried: ice, tylenol    LMP: NA Pregnant: NA    Recommended disposition:  Callback by PCP today. Care advice provided, patient verbalizes understanding; denies any other questions or concerns; instructed to call back for any new or worsening symptoms. Attempted to call [address] [facility] to inform of needed call back to patient. Phone rang for several minutes with no answer. Routing high priority to provider. Please call patient back with further instructions. Attention Provider: Thank you for allowing me to participate in the care of your patient. The patient was connected to triage in response to information provided to the [facility]. Please do not respond through this encounter as the response is not directed to a shared pool.       Reason for Disposition   [1] Pain, tenderness, or swelling at the injection site AND [2] over 3 days (72 hours) since vaccine AND [3] getting worse   [1] Redness around the injection site AND [2] started > 48 hours after getting vaccine  (Exception: Red area < 1 inch or 2.5 cm wide.)    Protocols used: Coronavirus (COVID-19) Vaccine Questions and Reactions-ADULT-[place]

## 2022-10-04 NOTE — PROGRESS NOTES
Subjective   CC:  Ryland Shape is a 62 y.o. female who presents for evaluation of possible vaccine site reaction.     -Patient is followed by allergy/immunology ([facility], Dr. Alonso Soria) and received her regular allergy shots last Wednesday, 9/28  -The following day she got her seasonal influenza vaccine in her right arm and both her second COVID booster [employer]) and pneumococcal vaccine in her left arm at the pharmacy  -That evening her left arm started to become red, warm, itchy, and swollen. The area progressively became more swollen and inflamed. She reports having subjective fever for the first 1 to 2 days after her vaccines. -She tried using topical Benadryl without much relief  -She followed up with her allergy doctor this morning who recommended using topical hydrocortisone cream, children's Benadryl, and ibuprofen for her symptoms. She has taken 1 dose of all of these medications today and notes that her symptoms have already started improving.  -She denies any tongue/throat swelling, shortness of breath, arm pain, chest pain  -She does note that her blood pressure has been elevated recently. She is compliant with her blood pressure medications. Review of Systems   Constitutional:  Positive for fever (low grade subjective fever, resolved). Negative for appetite change and chills. HENT:  Negative for congestion, rhinorrhea, trouble swallowing and voice change. Respiratory:  Negative for cough and shortness of breath. Gastrointestinal:  Negative for nausea and vomiting. Musculoskeletal:  Positive for joint swelling. Negative for myalgias. Skin:  Positive for color change and rash. Allergic/Immunologic: Positive for environmental allergies. Neurological:  Negative for weakness and numbness.      Past Medical History  Past Medical History:   Diagnosis Date    Adverse effect of anesthesia     slow to wake up    Diabetes (HCC)     Fibroids     High cholesterol Hypertension     Keloid        Current Medications  Current Outpatient Medications   Medication Sig Dispense Refill    triamcinolone acetonide (KENALOG) 0.1 % topical cream Apply  to affected area two (2) times a day. use thin layer over affected area, use for up to 2 weeks at a time 45 g 0    montelukast (SINGULAIR) 10 mg tablet TAKE 1 TABLET BY MOUTH ONCE DAILY 2 HOURS PRIOR TO ALLERGY INJECTION WITH AN ADDITIONAL DOSE OF ZYRTEC 10 MG FOR LOCAL ALLERGY INJECTION REACTIONS      amLODIPine (NORVASC) 5 mg tablet Take 1 tablet by mouth once daily 90 Tablet 0    atorvastatin (LIPITOR) 40 mg tablet Take 1 Tablet by mouth daily. 90 Tablet 0    azelastine (ASTELIN) 137 mcg (0.1 %) nasal spray       fexofenadine (ALLEGRA) 180 mg tablet Take 180 mg by mouth in the morning. levocetirizine (XYZAL) 5 mg tablet daily. fluticasone propionate (FLONASE) 50 mcg/actuation nasal spray       hydroCHLOROthiazide (HYDRODIURIL) 12.5 mg tablet Take 1 tablet by mouth once daily 90 Tablet 1    metFORMIN ER (GLUCOPHAGE XR) 500 mg tablet Take 1 Tablet by mouth daily (with dinner). 90 Tablet 3    zinc 50 mg tab tablet Take 50 mg by mouth daily. ascorbic acid, vitamin C, (VITAMIN C) 500 mg tablet Take 500 mg by mouth daily. cholecalciferol (VITAMIN D3) 25 mcg (1,000 unit) cap Take  by mouth daily. aspirin 81 mg chewable tablet Take 1 Tab by mouth daily. 30 Tab 0    B.infantis-B.ani-B.long-B.bifi (Probiotic 4X) 10-15 mg TbEC Take  by mouth daily.  (Patient not taking: Reported on 9/29/2022)         Allergies  Allergies   Allergen Reactions    Lisinopril Cough    Morphine (Pf) Other (comments)    Pfizer Covid-19 Vaccine Anne Soliz) [KGYFZ-11 Vacc,Mrna(Pfizer)(Pf)] Swelling       Social History   Social History     Socioeconomic History    Marital status: SINGLE     Spouse name: Not on file    Number of children: Not on file    Years of education: Not on file    Highest education level: Not on file   Occupational History    Not on file   Tobacco Use    Smoking status: Never    Smokeless tobacco: Never   Vaping Use    Vaping Use: Never used   Substance and Sexual Activity    Alcohol use: Yes     Comment: rarely    Drug use: Never    Sexual activity: Yes     Partners: Male     Birth control/protection: None   Other Topics Concern    Not on file   Social History Narrative    Not on file     Social Determinants of Health     Financial Resource Strain: Not on file   Food Insecurity: Not on file   Transportation Needs: Not on file   Physical Activity: Not on file   Stress: Not on file   Social Connections: Not on file   Intimate Partner Violence: Not on file   Housing Stability: Not on file       Family History  Family History   Problem Relation Age of Onset    Diabetes Mother     Hypertension Mother     Elevated Lipids Mother     Diabetes Father     Hypertension Father     Heart Failure Father     Breast Cancer Maternal Aunt        Objective   Vital Signs  Visit Vitals  BP (!) 144/81 (BP 1 Location: Left upper arm, BP Patient Position: Sitting, BP Cuff Size: Large adult long)   Pulse 99   Temp 98 °F (36.7 °C) (Temporal)   Resp 19   Ht 5' 2\" (1.575 m)   Wt 298 lb (135.2 kg)   LMP 02/25/2016   SpO2 96%   BMI 54.50 kg/m²       Physical Examination  Physical Exam  Vitals reviewed. Constitutional:       General: She is not in acute distress. Appearance: She is obese. She is not ill-appearing. HENT:      Head: Normocephalic and atraumatic. Pulmonary:      Effort: Pulmonary effort is normal. No respiratory distress. Skin:     General: Skin is warm and dry. Findings: Erythema present. Comments: Area of erythema and soft tissue swelling noted on distal/lateral aspect of left upper arm, measuring approximately 14 x 19 cm. Small amount of induration noted along the upper aspect of erythema. Affected area is slightly warm. No papules, pustules, fluctuance, or drainage noted.    Neurological:      Mental Status: She is alert and oriented to person, place, and time. Sensory: No sensory deficit. Assessment and Plan   Jesse House is a 62 y.o. who is here for evaluation of vaccine site reaction. 1. Vaccine reaction, initial encounter - Patient presents with localized redness, itching, and swelling following [address] vaccine and pneumococcal vaccine on 9/29. She received these vaccines the day after having a regularly scheduled allergy shot. Symptoms are improving with use of topical hydrocortisone cream as well as children's Benadryl and ibuprofen. She is requesting a slightly stronger topical steroid cream to help with the itching. We will send in Kenalog cream for this. Advised that she continue current management. She can also use cool compresses on the affected area. No signs of infection at this time. - triamcinolone acetonide (KENALOG) 0.1 % topical cream; Apply  to affected area two (2) times a day. use thin layer over affected area, use for up to 2 weeks at a time  Dispense: 45 g; Refill: 0    2. Essential hypertension - Blood pressure has reportedly the been elevated at home recently and is above goal in clinic today. Advised that she continue checking her blood pressure at home and follow-up in 1 to 2 weeks for blood pressure check. Asked that she bring her home blood pressure cuff to this appointment so that it can be compared to our clinic cuff. For now will continue prescribed amlodipine 5 mg daily and hydrochlorothiazide 12.5 mg daily. Patient is counseled to return to the office if symptoms do not improve as expected. Urgent consultation with the nearest Emergency Department is strongly recommended if condition worsens. Patient is counseled to follow up as recommended and to inform the office if any changes in treatment are recommended.       I discussed this patient with Dr. Sakshi Lezama (Attending Physician)       Signed By:  Payal Chaparro DO  Family Medicine Resident

## 2022-10-04 NOTE — PROGRESS NOTES
Chief Complaint   Patient presents with    Follow-up     Reaction to vaccines. Visit Vitals  BP (!) 144/81 (BP 1 Location: Left upper arm, BP Patient Position: Sitting, BP Cuff Size: Large adult long)   Pulse 99   Temp 98 °F (36.7 °C) (Temporal)   Resp 19   Ht 5' 2\" (1.575 m)   Wt 298 lb (135.2 kg)   SpO2 96%   BMI 54.50 kg/m²     1. Have you been to the ER, urgent care clinic since your last visit? Hospitalized since your last visit? No    2. Have you seen or consulted any other health care providers outside of the [address] [place] [address] since your last visit? Include any pap smears or colon screening.  No

## 2022-10-18 ENCOUNTER — OFFICE VISIT (OUTPATIENT)
Dept: FAMILY MEDICINE CLINIC | Age: 57
End: 2022-10-18
Payer: MEDICAID

## 2022-10-18 VITALS
RESPIRATION RATE: 17 BRPM | WEIGHT: 293 LBS | OXYGEN SATURATION: 98 % | DIASTOLIC BLOOD PRESSURE: 96 MMHG | HEIGHT: 62 IN | TEMPERATURE: 97.5 F | BODY MASS INDEX: 53.92 KG/M2 | SYSTOLIC BLOOD PRESSURE: 150 MMHG | HEART RATE: 80 BPM

## 2022-10-18 DIAGNOSIS — I10 ESSENTIAL HYPERTENSION: Primary | ICD-10-CM

## 2022-10-18 RX ORDER — HYDROCHLOROTHIAZIDE 25 MG/1
25 TABLET ORAL DAILY
Qty: 90 TABLET | Refills: 2 | Status: SHIPPED | OUTPATIENT
Start: 2022-10-18

## 2022-10-18 NOTE — PROGRESS NOTES
Chief Complaint   Patient presents with    Hypertension    Allergic Reaction     To COVID vaccine left arm     1. Have you been to the ER, urgent care clinic since your last visit? Hospitalized since your last visit? No    2. Have you seen or consulted any other health care providers outside of the [address] [place] [address] since your last visit? Include any pap smears or colon screening.  No

## 2022-10-18 NOTE — PROGRESS NOTES
[address] [place] [address]   Office [phone], Fax [phone]    Subjective:     Chief Complaint   Patient presents with    Hypertension    Allergic Reaction     To COVID vaccine left arm       HPI:  Christie Reagan is a 62 y.o. female with a history of HTN that presents for: Follow Up HTN    HTN:   - BP in 140s-150s recently on home measurements   - Currently on Amlodipine 5 and HCTZ 12.5 daily   - no stroke or MI history   - denies symptoms of headache, palpitations, SOB    Arm Swelling Post Vaccine:   - improving at this time, no pain. Patient no longer concerned. Health Maintenance: There are no preventive care reminders to display for this patient. Past Medical Hx  I personally reviewed. Past Medical History:   Diagnosis Date    Adverse effect of anesthesia     slow to wake up    Diabetes ([facility] [address]     Fibroids     High cholesterol     Hypertension     Keloid         SocHx   I personally reviewed. Social History     Socioeconomic History    Marital status: SINGLE     Spouse name: Not on file    Number of children: Not on file    Years of education: Not on file    Highest education level: Not on file   Occupational History    Not on file   Tobacco Use    Smoking status: Never    Smokeless tobacco: Never   Vaping Use    Vaping Use: Never used   Substance and Sexual Activity    Alcohol use: Yes     Comment: rarely    Drug use: Never    Sexual activity: Yes     Partners: Male     Birth control/protection: None   Other Topics Concern    Not on file   Social History Narrative    Not on file     Social Determinants of Health     Financial Resource Strain: Not on file   Food Insecurity: Not on file   Transportation Needs: Not on file   Physical Activity: Not on file   Stress: Not on file   Social Connections: Not on file   Intimate Partner Violence: Not on file   Housing Stability: Not on file        Allergies  I personally reviewed.   Allergies   Allergen Reactions Lisinopril Cough    Morphine (Pf) Other (comments)    Pfizer Covid-19 Vaccine [employer]) [[id number]-73 Vacc,Mrna(Pfizer)(Pf)] Swelling        Medications  I personally reviewed. Current Outpatient Medications on File Prior to Visit   Medication Sig Dispense Refill    MAGNESIUM PO Take  by mouth.      triamcinolone acetonide (KENALOG) 0.1 % topical cream Apply  to affected area two (2) times a day. use thin layer over affected area, use for up to 2 weeks at a time 45 g 0    montelukast (SINGULAIR) 10 mg tablet TAKE 1 TABLET BY MOUTH ONCE DAILY 2 HOURS PRIOR TO ALLERGY INJECTION WITH AN ADDITIONAL DOSE OF ZYRTEC 10 MG FOR LOCAL ALLERGY INJECTION REACTIONS      amLODIPine (NORVASC) 5 mg tablet Take 1 tablet by mouth once daily 90 Tablet 0    atorvastatin (LIPITOR) 40 mg tablet Take 1 Tablet by mouth daily. 90 Tablet 0    azelastine (ASTELIN) 137 mcg (0.1 %) nasal spray       fexofenadine (ALLEGRA) 180 mg tablet Take 180 mg by mouth in the morning. levocetirizine (XYZAL) 5 mg tablet daily. fluticasone propionate (FLONASE) 50 mcg/actuation nasal spray       metFORMIN ER (GLUCOPHAGE XR) 500 mg tablet Take 1 Tablet by mouth daily (with dinner). 90 Tablet 3    B.infantis-B.ani-B.long-B.bifi (Probiotic 4X) 10-15 mg TbEC Take  by mouth daily. zinc 50 mg tab tablet Take 50 mg by mouth daily. ascorbic acid, vitamin C, (VITAMIN C) 500 mg tablet Take 500 mg by mouth daily. cholecalciferol (VITAMIN D3) 25 mcg (1,000 unit) cap Take  by mouth daily. aspirin 81 mg chewable tablet Take 1 Tab by mouth daily. 30 Tab 0     No current facility-administered medications on file prior to visit. ROS:    Review of Systems   Constitutional:  Negative for chills and fever. HENT:  Negative for congestion, ear pain, hearing loss and sore throat. Eyes:  Negative for blurred vision and pain. Respiratory:  Negative for cough and shortness of breath.     Cardiovascular:  Negative for chest pain, palpitations and leg swelling. Gastrointestinal:  Negative for abdominal pain, diarrhea, nausea and vomiting. Genitourinary:  Negative for dysuria. Musculoskeletal:  Negative for back pain. Skin:  Negative for rash. Neurological:  Negative for sensory change, focal weakness and headaches. Endo/Heme/Allergies:  Does not bruise/bleed easily. Psychiatric/Behavioral:  Negative for depression and suicidal ideas. Objective:   Vitals  I personally reviewed. Visit Vitals  BP (!) 150/96   Pulse 80   Temp 97.5 °F (36.4 °C) (Temporal)   Resp 17   Ht 5' 2\" (1.575 m)   Wt 299 lb (135.6 kg)   LMP 02/25/2016   SpO2 98%   BMI 54.69 kg/m²        Physical Exam:    Physical Exam  Constitutional:       Appearance: Normal appearance. She is obese. HENT:      Head: Normocephalic and atraumatic. Right Ear: External ear normal.      Left Ear: External ear normal.      Nose: Nose normal.      Mouth/Throat:      Mouth: Mucous membranes are moist.      Pharynx: Oropharynx is clear. No oropharyngeal exudate. Eyes:      Extraocular Movements: Extraocular movements intact. Conjunctiva/sclera: Conjunctivae normal.      Pupils: Pupils are equal, round, and reactive to light. Cardiovascular:      Rate and Rhythm: Normal rate and regular rhythm. Pulses: Normal pulses. Heart sounds: Normal heart sounds. Pulmonary:      Effort: Pulmonary effort is normal.      Breath sounds: Normal breath sounds. No wheezing. Abdominal:      General: Abdomen is flat. Palpations: Abdomen is soft. There is no mass. Tenderness: There is no abdominal tenderness. Musculoskeletal:         General: No swelling, tenderness or signs of injury. Normal range of motion. Cervical back: Normal range of motion. No rigidity or tenderness. Skin:     General: Skin is warm and dry. Capillary Refill: Capillary refill takes less than 2 seconds. Neurological:      General: No focal deficit present.       Mental Status: She is alert and oriented to person, place, and time. Cranial Nerves: No cranial nerve deficit. Psychiatric:         Mood and Affect: Mood normal.         Behavior: Behavior normal.          Assessment/Plan:     58yo female with follow up for HTN, pressures increased in clinic and at home. HTN: Increase HCTZ t o25mg daily. Keep home log, get new cuff. Return in 2 weeks with log. BMP today (none in the last year)  - BMP today     Weight: Wants to follow up with Dr. Analia Mon for weight loss. Forward for weight loss appointment     Diagnoses and all orders for this visit:    1. Essential hypertension  -     hydroCHLOROthiazide (HYDRODIURIL) 25 mg tablet; Take 1 Tablet by mouth daily.  -     METABOLIC PANEL, BASIC; Future       Follow-up and Dispositions    Return for 2 week follow up for HTN, please also schedule for weight loss visit with Dr. Analia Mon . Pt was discussed with Dr Sary Lizama (attending physician). I have reviewed patient medical and social history and medications. I have reviewed pertinent labs results and other data. I have discussed the diagnosis with the patient and the intended plan as seen in the above orders. The patient has received an after-visit summary and questions were answered concerning future plans. I have discussed medication side effects and warnings with the patient as well.     Lalo Mi MD  Resident [facility]  10/23/22

## 2022-10-19 LAB
BUN SERPL-MCNC: 10 MG/DL (ref 6–24)
BUN/CREAT SERPL: 13 (ref 9–23)
CALCIUM SERPL-MCNC: 9.5 MG/DL (ref 8.7–10.2)
CHLORIDE SERPL-SCNC: 103 MMOL/L (ref 96–106)
CO2 SERPL-SCNC: 25 MMOL/L (ref 20–29)
CREAT SERPL-MCNC: 0.76 MG/DL (ref 0.57–1)
EGFR: 91 ML/MIN/1.73
GLUCOSE SERPL-MCNC: 90 MG/DL (ref 70–99)
POTASSIUM SERPL-SCNC: 4.2 MMOL/L (ref 3.5–5.2)
SODIUM SERPL-SCNC: 142 MMOL/L (ref 134–144)

## 2022-11-10 ENCOUNTER — OFFICE VISIT (OUTPATIENT)
Dept: FAMILY MEDICINE CLINIC | Age: 57
End: 2022-11-10
Payer: MEDICAID

## 2022-11-10 VITALS
RESPIRATION RATE: 16 BRPM | DIASTOLIC BLOOD PRESSURE: 76 MMHG | HEIGHT: 62 IN | WEIGHT: 293 LBS | BODY MASS INDEX: 53.92 KG/M2 | OXYGEN SATURATION: 96 % | HEART RATE: 87 BPM | SYSTOLIC BLOOD PRESSURE: 120 MMHG

## 2022-11-10 DIAGNOSIS — E11.9 NEWLY DIAGNOSED DIABETES (HCC): ICD-10-CM

## 2022-11-10 DIAGNOSIS — E11.9 TYPE 2 DIABETES MELLITUS WITHOUT COMPLICATION, WITHOUT LONG-TERM CURRENT USE OF INSULIN (HCC): ICD-10-CM

## 2022-11-10 DIAGNOSIS — I10 ESSENTIAL HYPERTENSION: ICD-10-CM

## 2022-11-10 DIAGNOSIS — E78.2 MIXED HYPERLIPIDEMIA: ICD-10-CM

## 2022-11-10 PROCEDURE — 99396 PREV VISIT EST AGE 40-64: CPT | Performed by: FAMILY MEDICINE

## 2022-11-10 PROCEDURE — 3074F SYST BP LT 130 MM HG: CPT | Performed by: FAMILY MEDICINE

## 2022-11-10 PROCEDURE — 3078F DIAST BP <80 MM HG: CPT | Performed by: FAMILY MEDICINE

## 2022-11-10 RX ORDER — AMLODIPINE BESYLATE 5 MG/1
5 TABLET ORAL DAILY
Qty: 90 TABLET | Refills: 3 | Status: SHIPPED | OUTPATIENT
Start: 2022-11-10

## 2022-11-10 RX ORDER — METFORMIN HYDROCHLORIDE 500 MG/1
500 TABLET, EXTENDED RELEASE ORAL
Qty: 90 TABLET | Refills: 3 | Status: SHIPPED | OUTPATIENT
Start: 2022-11-10

## 2022-11-10 RX ORDER — ATORVASTATIN CALCIUM 40 MG/1
40 TABLET, FILM COATED ORAL DAILY
Qty: 90 TABLET | Refills: 3 | Status: SHIPPED | OUTPATIENT
Start: 2022-11-10

## 2022-11-10 NOTE — PROGRESS NOTES
Deandre Garzon is a 62 y.o. female    Chief Complaint   Patient presents with    Complete Physical         1. Have you been to the ER, urgent care clinic since your last visit? Hospitalized since your last visit? No  2. Have you seen or consulted any other health care providers outside of the [address] [place] [address] since your last visit? Include any pap smears or colon screening. No    Visit Vitals  /76 (BP 1 Location: Left arm, BP Patient Position: Sitting)   Pulse 87   Resp 16   Ht 5' 2\" (1.575 m)   Wt 294 lb (133.4 kg)   SpO2 96%   BMI 53.77 kg/m²     3 most recent PHQ Screens 10/18/2022   Little interest or pleasure in doing things Not at all   Feeling down, depressed, irritable, or hopeless Several days   Total Score PHQ 2 1     There are no preventive care reminders to display for this patient.

## 2022-11-10 NOTE — PROGRESS NOTES
HPI:  Jayden Grady is a 62 y.o. female presenting for well woman exam.     Acute complaints: Struggling with weight loss    Exercise: Not exercising    Diet: Trying to watch diet. GYN: No vaginal bleeding. Normal paps in the past.     Health Maintenance - reviewed:  Pap (age 21-65): 2018 - NILM with negative HPV. Due in 2023. Mammogram (age 54-69): 4/2022. Normal.    Colonoscopy (age 54-65): 2021 - 10 yr follow up    DEXA (>/= 73 yo or sooner with RF): Not yet indicated. Allergies- reviewed: Allergies   Allergen Reactions    Lisinopril Cough    Morphine (Pf) Other (comments)    Pfizer Covid-19 Vaccine Naif Shai) [SKLHX-08 Vacc,Mrna(Pfizer)(Pf)] Swelling         Medications- reviewed:   Current Outpatient Medications   Medication Sig    metFORMIN ER (GLUCOPHAGE XR) 500 mg tablet Take 1 Tablet by mouth daily (with dinner). atorvastatin (LIPITOR) 40 mg tablet Take 1 Tablet by mouth daily. amLODIPine (NORVASC) 5 mg tablet Take 1 Tablet by mouth daily. semaglutide (OZEMPIC) 0.25 mg or 0.5 mg/dose (2 mg/1.5 ml) subq pen 0.25 mg every seven (7) days for 28 days, THEN 0.5 mg every seven (7) days for 28 days. MAGNESIUM PO Take  by mouth. hydroCHLOROthiazide (HYDRODIURIL) 25 mg tablet Take 1 Tablet by mouth daily. triamcinolone acetonide (KENALOG) 0.1 % topical cream Apply  to affected area two (2) times a day. use thin layer over affected area, use for up to 2 weeks at a time    montelukast (SINGULAIR) 10 mg tablet TAKE 1 TABLET BY MOUTH ONCE DAILY 2 HOURS PRIOR TO ALLERGY INJECTION WITH AN ADDITIONAL DOSE OF ZYRTEC 10 MG FOR LOCAL ALLERGY INJECTION REACTIONS    azelastine (ASTELIN) 137 mcg (0.1 %) nasal spray     fexofenadine (ALLEGRA) 180 mg tablet Take 180 mg by mouth in the morning. levocetirizine (XYZAL) 5 mg tablet daily. fluticasone propionate (FLONASE) 50 mcg/actuation nasal spray     B.infantis-B.ani-B.long-B.bifi (Probiotic 4X) 10-15 mg TbEC Take  by mouth daily.     zinc 50 mg tab tablet Take 50 mg by mouth daily. cholecalciferol (VITAMIN D3) 25 mcg (1,000 unit) cap Take  by mouth daily. ascorbic acid, vitamin C, (VITAMIN C) 500 mg tablet Take 500 mg by mouth daily. aspirin 81 mg chewable tablet Take 1 Tab by mouth daily. No current facility-administered medications for this visit.          Past Medical History- reviewed:  Past Medical History:   Diagnosis Date    Adverse effect of anesthesia     slow to wake up    Diabetes ([address]     Fibroids     High cholesterol     Hypertension     Keloid          Past Surgical History- reviewed:   Past Surgical History:   Procedure Laterality Date    COLONOSCOPY N/A 3/15/2021    COLONOSCOPY performed by Mitch Rico MD at [facility] ENDOSCOPY    HX BUNIONECTOMY      HX MYOMECTOMY      HX OTHER SURGICAL      cyst removed from left wrist    HX SKIN BIOPSY      Keloid removal         Social History- reviewed:  Social History     Socioeconomic History    Marital status: SINGLE     Spouse name: Not on file    Number of children: Not on file    Years of education: Not on file    Highest education level: Not on file   Occupational History    Not on file   Tobacco Use    Smoking status: Never    Smokeless tobacco: Never   Vaping Use    Vaping Use: Never used   Substance and Sexual Activity    Alcohol use: Yes     Comment: rarely    Drug use: Never    Sexual activity: Yes     Partners: Male     Birth control/protection: None   Other Topics Concern    Not on file   Social History Narrative    Not on file     Social Determinants of Health     Financial Resource Strain: Not on file   Food Insecurity: Not on file   Transportation Needs: Not on file   Physical Activity: Not on file   Stress: Not on file   Social Connections: Not on file   Intimate Partner Violence: Not on file   Housing Stability: Not on file         Immunizations- reviewed:   Immunization History   Administered Date(s) Administered    COVID-19, PFIZER PURPLE top, DILUTE for use, (age 15 y+), IM, 30mcg/0.3mL 03/23/2021, 04/13/2021, 11/04/2021, 09/29/2022    Hep A Vaccine 08/10/2018, 03/01/2019    Hep B Vaccine 04/11/2018, 05/14/2018, 08/10/2018    Influenza Vaccine 10/01/2020, 09/29/2022    Influenza, FLUARIX, FLULAVAL, Tarri Maffucci (age 10 mo+) AND AFLURIA, (age 1 y+), PF, 0.5mL 10/26/2021, 09/29/2022    MMR 04/11/2018, 05/14/2018    Pneumococcal Conjugate PCV20, PF (Prevnar 20) 09/29/2022    Pneumococcal Polysaccharide (PPSV-23) 03/11/2021    TB Skin Test (PPD) 03/26/2018, 03/28/2018, 04/09/2018, 04/11/2018    Tdap 04/11/2018, 04/11/2018    Zoster Recombinant 08/10/2018, 10/22/2018     Review of systems:  Items bolded if positive. Constitutional: Fever, chills, night sweats, weight loss, lymphadenopathy, fatigue  HEENT: Vision change, eye pain, rhinorrhea, sinus pain, epistaxis, dysphagia, change in hearing, tinnitus, vertigo.    Endocrine: Weight change, heat/ cold intolerance, tremor, insomnia, polyuria, polydipsia, polyphagia, abnl hair growth, nail changes  Cardiovascular: Chest pain, palpitations, syncope, lower extremity edema, orthopnea, paroxysmal nocturnal dyspnea  Pulmonary: Shortness of breath, dyspnea on exertion, cough, hemoptysis, wheezing  GI: Nausea, vomiting, diarrhea, melena, hematochezia, change in appetite, abdominal pain, change in bowel habits or stools  : Dysuria, frequency, urgency, incontinence, hematuria, nocturia  Musculoskeletal: joint swelling or pain, muscle pain, back pain  Skin:  Rash, New/growing/changing skin lesions  Neurologic: Headache, muscle weakness, paresthesias, anesthesia, ataxia, change in speech, change in gait   Psychiatric: depression, anxiety, hallucinations, jarek, SI/HI      Physical Exam  Visit Vitals  /76 (BP 1 Location: Left arm, BP Patient Position: Sitting)   Pulse 87   Resp 16   Ht 5' 2\" (1.575 m)   Wt 294 lb (133.4 kg)   LMP 02/25/2016   SpO2 96%   BMI 53.77 kg/m²       General appearance - alert, well appearing, and in no distress and overweight  Chest - clear to auscultation, no wheezes, rales or rhonchi, symmetric air entry  Heart - normal rate, regular rhythm, normal S1, S2, no murmurs, rubs, clicks or gallops  Abdomen - soft, nontender, nondistended, no masses or organomegaly  Neurological - alert, oriented, normal speech, no focal findings or movement disorder noted  Musculoskeletal - no joint tenderness, deformity or swelling  Extremities - peripheral pulses normal, no pedal edema, no clubbing or cyanosis  Skin - normal coloration and turgor, no rashes, no suspicious skin lesions noted    Assessment/Plan:   Ms. Fausto Echevarria is a 62 y.o. female presenting for [facility] woman health maintenance visit. Counseled on importance of healthy diet, regular exercise, healthy lifestyle (i.e. Safe sex practices, seatbelt safety, wearing sunscreen, etc.)    Pap smear due . Mammogram annually    Colonoscopy up to date; q 10 yrs. Next due . Reviewed last labs. Will plan to repeat labs in 2 mo so that we can repeat A1c. Obesity, BMI 53. Discussed weight loss options. Given her known DM, will trial GLP1ra. Ozempic ordered. Encouraged to continue with diet and exercise. Refilled chronic meds. No changes. Follow-up: Return for yearly wellness visits      Orders Placed This Encounter    metFORMIN ER (GLUCOPHAGE XR) 500 mg tablet     Sig: Take 1 Tablet by mouth daily (with dinner). Dispense:  90 Tablet     Refill:  3    atorvastatin (LIPITOR) 40 mg tablet     Sig: Take 1 Tablet by mouth daily. Dispense:  90 Tablet     Refill:  3    amLODIPine (NORVASC) 5 mg tablet     Sig: Take 1 Tablet by mouth daily. Dispense:  90 Tablet     Refill:  3    semaglutide (OZEMPIC) 0.25 mg or 0.5 mg/dose (2 mg/1.5 ml) subq pen     Si.25 mg every seven (7) days for 28 days, THEN 0.5 mg every seven (7) days for 28 days.      Dispense:  12 Box     Refill:  0         I have discussed the diagnosis with the patient and the intended plan as seen in the above orders. The patient has received an after-visit summary and questions were answered concerning future plans. Informed pt to return to the office if new symptoms arise.       Asaf Dumont MD

## 2022-12-09 RX ORDER — DULAGLUTIDE 0.75 MG/.5ML
0.75 INJECTION, SOLUTION SUBCUTANEOUS
Qty: 1 EACH | Refills: 0 | Status: SHIPPED | OUTPATIENT
Start: 2022-12-09

## 2023-01-17 ENCOUNTER — TELEPHONE (OUTPATIENT)
Dept: FAMILY MEDICINE CLINIC | Age: 58
End: 2023-01-17

## 2023-01-18 ENCOUNTER — TELEPHONE (OUTPATIENT)
Dept: FAMILY MEDICINE CLINIC | Age: 58
End: 2023-01-18

## 2023-01-18 NOTE — TELEPHONE ENCOUNTER
Called and spoke with Ms. Zavala to notify her that the certificate of medical necessity has been faxed and emailed to [address] [place] [address]. A copy was also emailed too Ms. Zavala. She verbalized understanding.

## 2023-01-30 DIAGNOSIS — E11.9 TYPE 2 DIABETES MELLITUS WITHOUT COMPLICATION, WITHOUT LONG-TERM CURRENT USE OF INSULIN (HCC): Primary | ICD-10-CM

## 2023-01-30 RX ORDER — DULAGLUTIDE 0.75 MG/.5ML
0.75 INJECTION, SOLUTION SUBCUTANEOUS
Qty: 1 EACH | Refills: 0 | OUTPATIENT
Start: 2023-01-30

## 2023-03-21 ENCOUNTER — TRANSCRIBE ORDER (OUTPATIENT)
Dept: SCHEDULING | Age: 58
End: 2023-03-21

## 2023-03-21 DIAGNOSIS — Z12.31 VISIT FOR SCREENING MAMMOGRAM: Primary | ICD-10-CM

## 2023-04-23 DIAGNOSIS — Z12.31 VISIT FOR SCREENING MAMMOGRAM: Primary | ICD-10-CM

## 2023-04-24 ENCOUNTER — HOSPITAL ENCOUNTER (OUTPATIENT)
Dept: MAMMOGRAPHY | Age: 58
Discharge: HOME | End: 2023-04-24
Attending: FAMILY MEDICINE
Payer: MEDICAID

## 2023-04-24 DIAGNOSIS — Z12.31 VISIT FOR SCREENING MAMMOGRAM: ICD-10-CM

## 2023-04-24 PROCEDURE — 77067 SCR MAMMO BI INCL CAD: CPT

## 2023-07-26 RX ORDER — HYDROCHLOROTHIAZIDE 25 MG/1
TABLET ORAL
Qty: 90 TABLET | Refills: 0 | Status: SHIPPED | OUTPATIENT
Start: 2023-07-26

## 2023-09-13 ENCOUNTER — OFFICE VISIT (OUTPATIENT)
Age: 58
End: 2023-09-13
Payer: MEDICAID

## 2023-09-13 VITALS
BODY MASS INDEX: 53.92 KG/M2 | HEIGHT: 62 IN | WEIGHT: 293 LBS | RESPIRATION RATE: 18 BRPM | SYSTOLIC BLOOD PRESSURE: 124 MMHG | HEART RATE: 82 BPM | DIASTOLIC BLOOD PRESSURE: 71 MMHG | OXYGEN SATURATION: 95 %

## 2023-09-13 DIAGNOSIS — E66.01 MORBID (SEVERE) OBESITY DUE TO EXCESS CALORIES (HCC): ICD-10-CM

## 2023-09-13 DIAGNOSIS — Z01.419 WELL WOMAN EXAM WITH ROUTINE GYNECOLOGICAL EXAM: Primary | ICD-10-CM

## 2023-09-13 DIAGNOSIS — N39.3 STRESS INCONTINENCE OF URINE: ICD-10-CM

## 2023-09-13 DIAGNOSIS — E11.9 TYPE 2 DIABETES MELLITUS WITHOUT COMPLICATION, WITHOUT LONG-TERM CURRENT USE OF INSULIN (HCC): ICD-10-CM

## 2023-09-13 DIAGNOSIS — I10 ESSENTIAL (PRIMARY) HYPERTENSION: ICD-10-CM

## 2023-09-13 PROBLEM — R35.1 NOCTURIA: Status: ACTIVE | Noted: 2021-10-28

## 2023-09-13 PROBLEM — M17.0 OSTEOARTHRITIS OF BOTH KNEES: Status: ACTIVE | Noted: 2021-10-28

## 2023-09-13 LAB
ALBUMIN, URINE, POC: 80 MG/L
CREATININE, URINE, POC: 200 MG/DL
MICROALB/CREAT RATIO, POC: NORMAL MG/G

## 2023-09-13 PROCEDURE — 99214 OFFICE O/P EST MOD 30 MIN: CPT | Performed by: FAMILY MEDICINE

## 2023-09-13 PROCEDURE — PBSHW AMB POC URINE, MICROALBUMIN, SEMIQUANT (3 RESULTS): Performed by: FAMILY MEDICINE

## 2023-09-13 PROCEDURE — 99396 PREV VISIT EST AGE 40-64: CPT | Performed by: FAMILY MEDICINE

## 2023-09-13 PROCEDURE — 3074F SYST BP LT 130 MM HG: CPT | Performed by: FAMILY MEDICINE

## 2023-09-13 PROCEDURE — 3078F DIAST BP <80 MM HG: CPT | Performed by: FAMILY MEDICINE

## 2023-09-13 PROCEDURE — 82044 UR ALBUMIN SEMIQUANTITATIVE: CPT | Performed by: FAMILY MEDICINE

## 2023-09-13 RX ORDER — ZINC GLUCONATE 50 MG
50 TABLET ORAL DAILY
COMMUNITY

## 2023-09-13 RX ORDER — SEMAGLUTIDE 0.68 MG/ML
INJECTION, SOLUTION SUBCUTANEOUS
Qty: 6 ML | Refills: 0 | Status: SHIPPED | OUTPATIENT
Start: 2023-09-13 | End: 2023-11-08

## 2023-09-13 ASSESSMENT — ENCOUNTER SYMPTOMS
RECTAL PAIN: 1
CHEST TIGHTNESS: 0
BLOOD IN STOOL: 0
SHORTNESS OF BREATH: 0
CONSTIPATION: 0

## 2023-09-14 ENCOUNTER — CLINICAL DOCUMENTATION (OUTPATIENT)
Age: 58
End: 2023-09-14

## 2023-09-14 LAB
ALBUMIN SERPL-MCNC: 3.9 G/DL (ref 3.8–4.9)
ALBUMIN/GLOB SERPL: 1.2 {RATIO} (ref 1.2–2.2)
ALP SERPL-CCNC: 123 IU/L (ref 44–121)
ALT SERPL-CCNC: 12 IU/L (ref 0–32)
AST SERPL-CCNC: 15 IU/L (ref 0–40)
BILIRUB SERPL-MCNC: 0.5 MG/DL (ref 0–1.2)
BUN SERPL-MCNC: 12 MG/DL (ref 6–24)
BUN/CREAT SERPL: 15 (ref 9–23)
CALCIUM SERPL-MCNC: 9.3 MG/DL (ref 8.7–10.2)
CHLORIDE SERPL-SCNC: 100 MMOL/L (ref 96–106)
CHOLEST SERPL-MCNC: 197 MG/DL (ref 100–199)
CO2 SERPL-SCNC: 25 MMOL/L (ref 20–29)
CREAT SERPL-MCNC: 0.78 MG/DL (ref 0.57–1)
EGFRCR SERPLBLD CKD-EPI 2021: 88 ML/MIN/1.73
ERYTHROCYTE [DISTWIDTH] IN BLOOD BY AUTOMATED COUNT: 15.3 % (ref 11.7–15.4)
GLOBULIN SER CALC-MCNC: 3.3 G/DL (ref 1.5–4.5)
GLUCOSE SERPL-MCNC: 136 MG/DL (ref 70–99)
HBA1C MFR BLD: 8.2 % (ref 4.8–5.6)
HCT VFR BLD AUTO: 39.1 % (ref 34–46.6)
HDLC SERPL-MCNC: 62 MG/DL
HGB BLD-MCNC: 12.7 G/DL (ref 11.1–15.9)
HIV 1+2 AB+HIV1 P24 AG SERPL QL IA: NON REACTIVE
IMP & REVIEW OF LAB RESULTS: NORMAL
LDLC SERPL CALC-MCNC: 118 MG/DL (ref 0–99)
MCH RBC QN AUTO: 26.7 PG (ref 26.6–33)
MCHC RBC AUTO-ENTMCNC: 32.5 G/DL (ref 31.5–35.7)
MCV RBC AUTO: 82 FL (ref 79–97)
PLATELET # BLD AUTO: 183 X10E3/UL (ref 150–450)
POTASSIUM SERPL-SCNC: 3.7 MMOL/L (ref 3.5–5.2)
PROT SERPL-MCNC: 7.2 G/DL (ref 6–8.5)
RBC # BLD AUTO: 4.76 X10E6/UL (ref 3.77–5.28)
SODIUM SERPL-SCNC: 140 MMOL/L (ref 134–144)
TRIGL SERPL-MCNC: 97 MG/DL (ref 0–149)
VLDLC SERPL CALC-MCNC: 17 MG/DL (ref 5–40)
WBC # BLD AUTO: 7.4 X10E3/UL (ref 3.4–10.8)

## 2023-09-14 NOTE — PROGRESS NOTES
Prior Authorization    Prior auth submitted via [email or web address]    Insurance Carrier/Provider: [employer] Medicaid    Medication Name/ Dosage: Ozempic (0.25 or 0.5 MG/DOSE) 2MG/3ML pen-injectors    Quantity/Day Supply: 3mL/84 days    DX: Type 2 diabetes mellitus without complication, without long-term current use of insulin ([address]) [E11.9]    Reference Number: PA Case: [id number]    Outcome: Status: Approved, Coverage Starts on: 9/14/2023 - Coverage Ends on: 9/13/2024     If Denied Reason for Denial:  N/A    Prior Authorization Note:  This writer contacted [employer], spoke with Karyle Qua, pharmacy technician. Two patient identifiers verified with pharmacy technician. Pharmacy technician informed of PA authorization approval. Claim submitted with positive outcome. This writer contacted patient, two patient identifiers verified with patient. Patient informed of the above information. Labn results reviewed per note per Dr. Yumiko Yepez. Patient verbalized understanding and denies further questions or concerns at the time of call.  No further action is required at this time

## 2023-09-15 DIAGNOSIS — Z01.419 WELL WOMAN EXAM WITH ROUTINE GYNECOLOGICAL EXAM: ICD-10-CM

## 2023-09-26 LAB
C TRACH RRNA CVX QL NAA+PROBE: NEGATIVE
CYTOLOGIST CVX/VAG CYTO: NORMAL
CYTOLOGY CVX/VAG DOC CYTO: NORMAL
CYTOLOGY CVX/VAG DOC THIN PREP: NORMAL
DX ICD CODE: NORMAL
HPV GENOTYPE REFLEX: NORMAL
HPV I/H RISK 4 DNA CVX QL PROBE+SIG AMP: NEGATIVE
Lab: NORMAL
N GONORRHOEA RRNA CVX QL NAA+PROBE: NEGATIVE
OTHER STN SPEC: NORMAL
STAT OF ADQ CVX/VAG CYTO-IMP: NORMAL
T VAGINALIS RRNA SPEC QL NAA+PROBE: NEGATIVE

## 2023-10-21 ENCOUNTER — PATIENT MESSAGE (OUTPATIENT)
Age: 58
End: 2023-10-21

## 2023-10-24 NOTE — TELEPHONE ENCOUNTER
Pt called in regards to this message sent on 10/21. Pt stated her last dose for the 0.5 mg is on 10/31. Please advise. Best number to reach pt is . Thank you!

## 2023-11-03 NOTE — TELEPHONE ENCOUNTER
Medication Refill Request    Sophiachristo Erickson is requesting a refill of the following medication(s):   Requested Prescriptions     Pending Prescriptions Disp Refills    amLODIPine (NORVASC) 5 MG tablet [Pharmacy Med Name: amLODIPine Besylate 5 MG Oral Tablet] 90 tablet 0     Sig: Take 1 tablet by mouth once daily      Last provider to prescribe medication: Dr. Maryellen Breaux  Last Date of Medication Prescribed: 11/10/2022   Last Office Visit Date: 09/13/2023  Last Labs:  Lab Results   Component Value Date     09/13/2023    K 3.7 09/13/2023     09/13/2023    CO2 25 09/13/2023    BUN 12 09/13/2023    CREATININE 0.78 09/13/2023    GLUCOSE 136 (H) 09/13/2023    CALCIUM 9.3 09/13/2023    PROT 7.2 09/13/2023    LABALBU 3.9 09/13/2023    BILITOT 0.5 09/13/2023    ALKPHOS 123 (H) 09/13/2023    AST 15 09/13/2023    ALT 12 09/13/2023    LABGLOM 88 09/13/2023    GFRAA 117 06/03/2021    AGRATIO 1.2 09/13/2023         Please send refill to:     [address] [facility],[address], [address] [place] [address] Beena Ferrer  Phone: [phone] Fax: [phone]

## 2023-11-06 DIAGNOSIS — E78.2 MIXED HYPERLIPIDEMIA: Primary | ICD-10-CM

## 2023-11-06 DIAGNOSIS — I10 ESSENTIAL (PRIMARY) HYPERTENSION: ICD-10-CM

## 2023-11-06 NOTE — TELEPHONE ENCOUNTER
Medication Refill Request    Sofiya Browning is requesting a refill of the following medication(s):   Requested Prescriptions     Pending Prescriptions Disp Refills    atorvastatin (LIPITOR) 40 MG tablet 90 tablet 1     Sig: Take 1 tablet by mouth daily    hydroCHLOROthiazide (HYDRODIURIL) 25 MG tablet 90 tablet 1     Sig: Take 1 tablet by mouth daily      Last provider to prescribe medication: Dr. Michel Fu  Last Date of Medication Prescribed: HCTZ 25mg -07/26/203  Atorvastatin - 11/10/2022  Last Office Visit Date: 09/13/2023  Last Labs   Lab Results   Component Value Date     09/13/2023    K 3.7 09/13/2023     09/13/2023    CO2 25 09/13/2023    BUN 12 09/13/2023    CREATININE 0.78 09/13/2023    GLUCOSE 136 (H) 09/13/2023    CALCIUM 9.3 09/13/2023    PROT 7.2 09/13/2023    LABALBU 3.9 09/13/2023    BILITOT 0.5 09/13/2023    ALKPHOS 123 (H) 09/13/2023    AST 15 09/13/2023    ALT 12 09/13/2023    LABGLOM 88 09/13/2023    GFRAA 117 06/03/2021    AGRATIO 1.2 09/13/2023         Lab Results   Component Value Date    CHOL 197 09/13/2023    TRIG 97 09/13/2023    HDL 62 09/13/2023    LDLCALC 118 (H) 09/13/2023    LABVLDL 17 09/13/2023    VLDL 21 09/29/2022       Please send refill to:     [address] [facility],[address], [address] [place] [address] Beena Ferrer  Phone: [phone] Fax: [phone]

## 2023-11-06 NOTE — TELEPHONE ENCOUNTER
Patient called requesting refills on Atorvastatin 40 mg and Hydrochlorothiazide 25 mg. Would like for prescriptions to be sent to [facility] on [address]. Thanks!

## 2023-11-07 RX ORDER — HYDROCHLOROTHIAZIDE 25 MG/1
TABLET ORAL
Qty: 90 TABLET | Refills: 0 | OUTPATIENT
Start: 2023-11-07

## 2023-11-07 RX ORDER — AMLODIPINE BESYLATE 5 MG/1
5 TABLET ORAL DAILY
Qty: 90 TABLET | Refills: 1 | Status: SHIPPED | OUTPATIENT
Start: 2023-11-07

## 2023-11-07 RX ORDER — ATORVASTATIN CALCIUM 40 MG/1
40 TABLET, FILM COATED ORAL DAILY
Qty: 90 TABLET | Refills: 1 | Status: SHIPPED | OUTPATIENT
Start: 2023-11-07

## 2023-11-07 RX ORDER — HYDROCHLOROTHIAZIDE 25 MG/1
25 TABLET ORAL DAILY
Qty: 90 TABLET | Refills: 1 | Status: SHIPPED | OUTPATIENT
Start: 2023-11-07

## 2023-12-01 NOTE — TELEPHONE ENCOUNTER
----- Message from Melani Guido sent at 11/30/2023  4:29 PM EST -----  Subject: Refill Request    QUESTIONS  Name of Medication? Semaglutide, 1 MG/DOSE, (OZEMPIC, 1 MG/DOSE,) 4 MG/3ML   SOPN  Patient-reported dosage and instructions? 4 MG/3ML   How many days do you have left? 0  Preferred Pharmacy? [address] phone number (if available)? [phone]  ---------------------------------------------------------------------------  --------------  Ginger Porter INFO  What is the best way for the office to contact you? OK to leave message on   voicemail  Preferred Call Back Phone Number? [phone]  ---------------------------------------------------------------------------  --------------  SCRIPT ANSWERS  Relationship to Patient?  Self

## 2023-12-03 RX ORDER — SEMAGLUTIDE 1.34 MG/ML
INJECTION, SOLUTION SUBCUTANEOUS
Qty: 3 ML | Refills: 0 | OUTPATIENT
Start: 2023-12-03

## 2023-12-04 ENCOUNTER — TELEPHONE (OUTPATIENT)
Age: 58
End: 2023-12-04

## 2023-12-04 RX ORDER — SEMAGLUTIDE 1.34 MG/ML
1 INJECTION, SOLUTION SUBCUTANEOUS
Qty: 3 ML | Refills: 0 | Status: SHIPPED | OUTPATIENT
Start: 2023-12-04

## 2023-12-04 NOTE — TELEPHONE ENCOUNTER
Requesting a referral for Pelvic Floor Therapy. Last Office Visit 09/13/2023  --Per note referral to pelvic floor therapy due to incontinence  --[facility] form placed in physician folder for review and signature        ----- Message from Sweta Appiah sent at 11/30/2023  4:31 PM EST -----  Subject: Referral Request    Reason for referral request? PELVIC FLOOR THERAPY. Provider patient wants to be referred to(if known):     Provider Phone Number(if known): Additional Information for Provider? Pt would like to get this started.    ---------------------------------------------------------------------------  --------------  Denzel January [facility]    [phone]; OK to leave message on voicemail  ---------------------------------------------------------------------------  --------------

## 2023-12-12 NOTE — TELEPHONE ENCOUNTER
Patient called again in regards to getting pelvic floor therapy. Patient wanted to know if a referral could be placed and if the form had been completed. Patient would like to be contacted with any updates at your earliest convenience. Thanks!

## 2023-12-14 NOTE — TELEPHONE ENCOUNTER
This writer attempted to contact patient in reference to therapy order. This writer was not able to reach patient at this time. A voicemail was left advising patient order has been placed for therapy.  [employer] message sent to patient with the attached referral and providing physical therapy locations to [facility]

## 2023-12-29 RX ORDER — SEMAGLUTIDE 1.34 MG/ML
INJECTION, SOLUTION SUBCUTANEOUS
Qty: 3 ML | Refills: 0 | Status: SHIPPED | OUTPATIENT
Start: 2023-12-29

## 2023-12-29 NOTE — TELEPHONE ENCOUNTER
Medication Refill Request    Manolo Mcmanus is requesting a refill of the following medication(s):   Requested Prescriptions     Pending Prescriptions Disp Refills    OZEMPIC, 1 MG/DOSE, 4 MG/3ML SOPN [Pharmacy Med Name: Ozempic (1 MG/DOSE) 4 MG/3ML Subcutaneous Solution Pen-injector] 3 mL 0     Sig: INJECT 1 MG SUBCUTANEOUSLY ONCE A WEEK        Listed PCP is Ludy Meier MD   Last provider to prescribe medication: Dr. Sabrina Mckenna  Last Date of Medication Prescribed: 12/04/2023   Date of Last Office Visit at [facility]: 09/13/2023   FUTURE APPOINTMENT: No future appointments. Please send refill to:     [address] [facility],[address], [address] [place] [address] [address]  Phone: [phone] Fax: [phone]

## 2024-01-24 ENCOUNTER — TELEPHONE (OUTPATIENT)
Age: 59
End: 2024-01-24

## 2024-01-24 DIAGNOSIS — E11.9 TYPE 2 DIABETES MELLITUS WITHOUT COMPLICATION, WITHOUT LONG-TERM CURRENT USE OF INSULIN (HCC): Primary | ICD-10-CM

## 2024-01-24 NOTE — TELEPHONE ENCOUNTER
Patient called stating that she is currently on Ozempic and has been on the same dosage for the last 3 months. She stated that she is due for a refill on the medication, but prior to getting the refill she wanted to know if her dosage will increase or stay the same. Patient would like to be contacted at your earliest convenience.    Thanks!

## 2024-01-25 RX ORDER — SEMAGLUTIDE 2.68 MG/ML
2 INJECTION, SOLUTION SUBCUTANEOUS
Qty: 9 ML | Refills: 1 | Status: SHIPPED | OUTPATIENT
Start: 2024-01-25

## 2024-01-25 NOTE — TELEPHONE ENCOUNTER
Called and left voicemail for patient to call back and schedule a follow up appointment with Dr. Brice to discuss weight management and to also let patient know doctor increased Ozempic medication.

## 2024-01-26 RX ORDER — SEMAGLUTIDE 1.34 MG/ML
INJECTION, SOLUTION SUBCUTANEOUS
Qty: 3 ML | Refills: 0 | OUTPATIENT
Start: 2024-01-26

## 2024-02-23 ENCOUNTER — OFFICE VISIT (OUTPATIENT)
Age: 59
End: 2024-02-23
Payer: MEDICAID

## 2024-02-23 VITALS
WEIGHT: 275 LBS | OXYGEN SATURATION: 96 % | DIASTOLIC BLOOD PRESSURE: 81 MMHG | HEIGHT: 62 IN | RESPIRATION RATE: 18 BRPM | SYSTOLIC BLOOD PRESSURE: 132 MMHG | HEART RATE: 78 BPM | TEMPERATURE: 97.9 F | BODY MASS INDEX: 50.61 KG/M2

## 2024-02-23 DIAGNOSIS — E11.9 TYPE 2 DIABETES MELLITUS WITHOUT COMPLICATION, WITHOUT LONG-TERM CURRENT USE OF INSULIN (HCC): Primary | ICD-10-CM

## 2024-02-23 DIAGNOSIS — J01.41 ACUTE RECURRENT PANSINUSITIS: ICD-10-CM

## 2024-02-23 LAB — HBA1C MFR BLD: 6.6 %

## 2024-02-23 PROCEDURE — 3075F SYST BP GE 130 - 139MM HG: CPT | Performed by: FAMILY MEDICINE

## 2024-02-23 PROCEDURE — 99214 OFFICE O/P EST MOD 30 MIN: CPT | Performed by: FAMILY MEDICINE

## 2024-02-23 PROCEDURE — 83036 HEMOGLOBIN GLYCOSYLATED A1C: CPT | Performed by: FAMILY MEDICINE

## 2024-02-23 PROCEDURE — 3079F DIAST BP 80-89 MM HG: CPT | Performed by: FAMILY MEDICINE

## 2024-02-23 RX ORDER — TIRZEPATIDE 7.5 MG/.5ML
7.5 INJECTION, SOLUTION SUBCUTANEOUS WEEKLY
Qty: 2 ML | Refills: 0 | Status: SHIPPED | OUTPATIENT
Start: 2024-02-23

## 2024-02-23 RX ORDER — AMOXICILLIN 500 MG/1
500 CAPSULE ORAL 2 TIMES DAILY
Qty: 14 CAPSULE | Refills: 0 | Status: SHIPPED | OUTPATIENT
Start: 2024-02-23 | End: 2024-03-01

## 2024-02-23 SDOH — ECONOMIC STABILITY: HOUSING INSECURITY
IN THE LAST 12 MONTHS, WAS THERE A TIME WHEN YOU DID NOT HAVE A STEADY PLACE TO SLEEP OR SLEPT IN A SHELTER (INCLUDING NOW)?: NO

## 2024-02-23 SDOH — ECONOMIC STABILITY: FOOD INSECURITY: WITHIN THE PAST 12 MONTHS, THE FOOD YOU BOUGHT JUST DIDN'T LAST AND YOU DIDN'T HAVE MONEY TO GET MORE.: NEVER TRUE

## 2024-02-23 SDOH — ECONOMIC STABILITY: INCOME INSECURITY: HOW HARD IS IT FOR YOU TO PAY FOR THE VERY BASICS LIKE FOOD, HOUSING, MEDICAL CARE, AND HEATING?: HARD

## 2024-02-23 SDOH — ECONOMIC STABILITY: TRANSPORTATION INSECURITY
IN THE PAST 12 MONTHS, HAS LACK OF TRANSPORTATION KEPT YOU FROM MEETINGS, WORK, OR FROM GETTING THINGS NEEDED FOR DAILY LIVING?: NO

## 2024-02-23 SDOH — ECONOMIC STABILITY: FOOD INSECURITY: WITHIN THE PAST 12 MONTHS, YOU WORRIED THAT YOUR FOOD WOULD RUN OUT BEFORE YOU GOT MONEY TO BUY MORE.: SOMETIMES TRUE

## 2024-02-23 ASSESSMENT — PATIENT HEALTH QUESTIONNAIRE - PHQ9
SUM OF ALL RESPONSES TO PHQ QUESTIONS 1-9: 3
SUM OF ALL RESPONSES TO PHQ QUESTIONS 1-9: 3
1. LITTLE INTEREST OR PLEASURE IN DOING THINGS: 2
SUM OF ALL RESPONSES TO PHQ QUESTIONS 1-9: 3
2. FEELING DOWN, DEPRESSED OR HOPELESS: 1
SUM OF ALL RESPONSES TO PHQ QUESTIONS 1-9: 3
SUM OF ALL RESPONSES TO PHQ9 QUESTIONS 1 & 2: 3

## 2024-02-23 ASSESSMENT — ENCOUNTER SYMPTOMS
SHORTNESS OF BREATH: 0
RHINORRHEA: 1
SINUS PRESSURE: 1

## 2024-02-23 NOTE — PROGRESS NOTES
History of Present Illness:     Chief Complaint   Patient presents with    Weight Management     Pt reports to F/U on weight management.  CC: Knot on the right foot, sore to touch & walking, noticed a week ago.  Rash on right hand under the thumb, Itchy, for a week.       Cathie Leyva is a 58 y.o. female     Rash on right hand    Bump on right foot    Diabetes  Previously tried Trulicity, stopped due to SE  Currently on Ozempic  A little constipation at the beginning but better now  Weight down about 25 lbs    URI symptoms  5 days of congestion and yellow/ green drainage  Tried OTC cold and cough meds  Followed by an allergist but this seems above her usual    PMH (REVIEWED):  Past Medical History:   Diagnosis Date    Adverse effect of anesthesia     slow to wake up    Diabetes (HCC)     Fibroids     High cholesterol     Hypertension     Keloid        Current Medications/Allergies (REVIEWED):     Current Outpatient Medications on File Prior to Visit   Medication Sig Dispense Refill    semaglutide, 2 MG/DOSE, (OZEMPIC, 2 MG/DOSE,) 8 MG/3ML SOPN sc injection Inject 0.75 mLs into the skin every 7 days 9 mL 1    metFORMIN (GLUCOPHAGE-XR) 500 MG extended release tablet TAKE 1 TABLET BY MOUTH ONCE DAILY WITH  DINNER 90 tablet 1    amLODIPine (NORVASC) 5 MG tablet Take 1 tablet by mouth once daily 90 tablet 1    atorvastatin (LIPITOR) 40 MG tablet Take 1 tablet by mouth daily 90 tablet 1    hydroCHLOROthiazide (HYDRODIURIL) 25 MG tablet Take 1 tablet by mouth daily 90 tablet 1    zinc 50 MG TABS tablet Take 1 tablet by mouth daily      Lactobacillus (ACIDOPHILUS PO) Take 1 capsule by mouth daily      Probiotic Product (PROBIOTIC MULTI-ENZYME PO) Take 1 capsule by mouth daily      ascorbic acid (VITAMIN C) 1000 MG tablet Take 0.5 tablets by mouth daily      aspirin 81 MG chewable tablet Take 1 tablet by mouth daily      azelastine (ASTELIN) 0.1 % nasal spray 2 sprays by Nasal route 2 times daily ceived the

## 2024-02-23 NOTE — PROGRESS NOTES
Patient has been identified by name and .    Chief Complaint   Patient presents with    Weight Management     Pt reports to F/U on weight management.  CC: Knot on the right foot, sore to touch & walking, noticed a week ago.  Rash on right hand under the thumb, Itchy, for a week.       Vitals:    24 1125   BP: 132/81   Site: Right Upper Arm   Position: Sitting   Cuff Size: Large Adult   Pulse: 78   Resp: 18   Temp: 97.9 °F (36.6 °C)   TempSrc: Oral   SpO2: 96%   Weight: 124.7 kg (275 lb)   Height: 1.575 m (5' 2\")        \"Have you been to the ER, urgent care clinic since your last visit?  Hospitalized since your last visit?\"    NO    “Have you seen or consulted any other health care providers outside of [facility] since your last visit?”    NO

## 2024-02-28 ENCOUNTER — CLINICAL DOCUMENTATION (OUTPATIENT)
Age: 59
End: 2024-02-28

## 2024-02-28 RX ORDER — LIRAGLUTIDE 6 MG/ML
1.2 INJECTION SUBCUTANEOUS DAILY
Qty: 2 ADJUSTABLE DOSE PRE-FILLED PEN SYRINGE | Refills: 3 | Status: CANCELLED | OUTPATIENT
Start: 2024-02-28

## 2024-02-28 NOTE — PROGRESS NOTES
Prior Authorization    Prior auth submitted via [email or web address]    Insurance Carrier/Provider: [employer] Electronic PA Form     Medication Name/ Dosage: Mounjaro 7.5MG/0.5ML pen-injectors     Quantity/Day Supply: 2mL/28 Days    DX: Type 2 diabetes mellitus without complication, without long-term current use of insulin (HCC) [E11.9]     Reference Number: PA Case ID #: [id number]     Outcome: Denied    If Denied Reason for Denial:   Patient must first experience therapeutic failure to at least TWO preferred drugs within the same class as appropriate for diagnosis, such as Byetta, Trulicity, Victoza.     Medication refill encounter forwarded to physician providing PA information and preferred medication recommendations.

## 2024-03-01 RX ORDER — LIRAGLUTIDE 6 MG/ML
1.2 INJECTION SUBCUTANEOUS DAILY
Qty: 2 ADJUSTABLE DOSE PRE-FILLED PEN SYRINGE | Refills: 3 | Status: SHIPPED | OUTPATIENT
Start: 2024-03-01

## 2024-03-01 NOTE — TELEPHONE ENCOUNTER
Prior authorization for Mounjaro Denied.     Prescription Coverage Criteria has not been met.      Patient must first experience therapeutic failure to at least TWO preferred drugs within the same class as appropriate for diagnosis, such as Byetta, Trulicity, Victoza.      Patient has had a therapeutic failure to Trulicity, Ozempic and Metformin

## 2024-03-28 ENCOUNTER — TELEPHONE (OUTPATIENT)
Age: 59
End: 2024-03-28

## 2024-03-28 DIAGNOSIS — E11.9 TYPE 2 DIABETES MELLITUS WITHOUT COMPLICATION, WITHOUT LONG-TERM CURRENT USE OF INSULIN (HCC): Primary | ICD-10-CM

## 2024-03-28 NOTE — TELEPHONE ENCOUNTER
Pt called in stating she doesn't want to take Liraglutide (VICTOZA) 18 MG/3ML SOPN SC injection since it is a daily stick, she prefers to go back to OZEMPIC, 1 MG/DOSE, 4 MG/3ML SOPN, since it is once a week. The insurance denied her Mounjaro. She also is requesting a call back from her provider or the nurse. Please advise.    Thank you.

## 2024-04-03 ENCOUNTER — TELEPHONE (OUTPATIENT)
Age: 59
End: 2024-04-03

## 2024-04-03 NOTE — TELEPHONE ENCOUNTER
[facility] Pharmacist called to request an alternative Rx for Ozempic. She stated Ozempic is now only coming in 3 ml.    Thank you

## 2024-04-03 NOTE — TELEPHONE ENCOUNTER
Per Dr Brice to reach out to pharmacy and approve for  Semaglutide 3 ml pen to be prescribed for patient. This writer gave verbal order per Dr Brice that 3 ml Semaglutide pen can be dispensed for patient. No further questions at this time.

## 2024-04-30 ENCOUNTER — HOSPITAL ENCOUNTER (OUTPATIENT)
Facility: HOSPITAL | Age: 59
Discharge: HOME | End: 2024-05-03
Attending: FAMILY MEDICINE
Payer: MEDICAID

## 2024-04-30 VITALS — WEIGHT: 275 LBS | BODY MASS INDEX: 50.61 KG/M2 | HEIGHT: 62 IN

## 2024-04-30 DIAGNOSIS — E11.9 TYPE 2 DIABETES MELLITUS WITHOUT COMPLICATION, WITHOUT LONG-TERM CURRENT USE OF INSULIN (HCC): ICD-10-CM

## 2024-04-30 DIAGNOSIS — Z12.31 SCREENING MAMMOGRAM FOR HIGH-RISK PATIENT: ICD-10-CM

## 2024-04-30 PROCEDURE — 77063 BREAST TOMOSYNTHESIS BI: CPT

## 2024-04-30 RX ORDER — SEMAGLUTIDE 0.68 MG/ML
0.5 INJECTION, SOLUTION SUBCUTANEOUS
Qty: 3 ML | Refills: 0 | Status: SHIPPED | OUTPATIENT
Start: 2024-04-30

## 2024-04-30 NOTE — TELEPHONE ENCOUNTER
Medication Refill Request    Cathie Leyva is requesting a refill of the following medication(s):   Requested Prescriptions     Pending Prescriptions Disp Refills    OZEMPIC, 0.25 OR 0.5 MG/DOSE, 2 MG/3ML SOPN [Pharmacy Med Name: Ozempic (0.25 or 0.5 MG/DOSE) 2 MG/3ML Subcutaneous Solution Pen-injector] 3 mL 0     Sig: INJECT 0.25 MG SUBCUTANEOUSLY EVERY 7 DAYS FOR 30 DAYS, THEN 0.5 MG EVERY 7 DAYS        Listed PCP is Meenu Brice MD   Last provider to prescribe medication: Dr. Brice  Last Date of Medication Prescribed: 03/28/2024   Date of Last Office Visit at [facility]: 02/23/2024   Last Labs:      Component  Ref Range & Units 2/23/24 1201   Hemoglobin A1C, POC  % 6.6          Future Appointment:   Future Appointments   Date Time Provider Department Center   4/30/2024  3:15 PM [facility] ANT 2 [facility]       Please send refill to:    [facility] - [place], [place] - [address] [facility] [phone] - F [phone]  [address] [facility] [address]  Phone: [phone] Fax: [phone]

## 2024-05-07 DIAGNOSIS — E11.9 TYPE 2 DIABETES MELLITUS WITHOUT COMPLICATION, WITHOUT LONG-TERM CURRENT USE OF INSULIN (HCC): ICD-10-CM

## 2024-05-07 RX ORDER — SEMAGLUTIDE 0.68 MG/ML
0.5 INJECTION, SOLUTION SUBCUTANEOUS
Qty: 3 ML | Refills: 0 | Status: CANCELLED | OUTPATIENT
Start: 2024-05-07

## 2024-05-12 DIAGNOSIS — I10 ESSENTIAL (PRIMARY) HYPERTENSION: ICD-10-CM

## 2024-05-14 RX ORDER — HYDROCHLOROTHIAZIDE 25 MG/1
25 TABLET ORAL DAILY
Qty: 90 TABLET | Refills: 0 | OUTPATIENT
Start: 2024-05-14

## 2024-05-14 NOTE — TELEPHONE ENCOUNTER
Medication Refill Request    Cathie Leyva is requesting a refill of the following medication(s):   Requested Prescriptions     Pending Prescriptions Disp Refills    hydroCHLOROthiazide (HYDRODIURIL) 25 MG tablet [Pharmacy Med Name: hydroCHLOROthiazide 25 MG Oral Tablet] 90 tablet 0     Sig: Take 1 tablet by mouth once daily        Listed PCP is Meenu Brice MD   Last provider to prescribe medication: Dr. Brice  Last Date of Medication Prescribed: 11/07/2023   Date of Last Office Visit at [facility]: 02/23/2024  Last Labs:  Lab Results   Component Value Date     09/13/2023    K 3.7 09/13/2023     09/13/2023    CO2 25 09/13/2023    BUN 12 09/13/2023    CREATININE 0.78 09/13/2023    GLUCOSE 136 (H) 09/13/2023    CALCIUM 9.3 09/13/2023    BILITOT 0.5 09/13/2023    ALKPHOS 123 (H) 09/13/2023    AST 15 09/13/2023    ALT 12 09/13/2023    LABGLOM 88 09/13/2023    GFRAA 117 06/03/2021    AGRATIO 1.2 09/13/2023         Future Appointment: No future appointments.    Refill Request Note: Follow Up Recommendations at last office visit was 3 months for diabetes follow up and weight loss. Message forwarded to PSR to assist with scheduling    Please send refill to:    [facility] [place], [place] - [address] [facility] [phone] - F [phone]  [address] [facility] [address]  Phone: [phone] Fax: [phone]

## 2024-05-14 NOTE — TELEPHONE ENCOUNTER
Patient called requesting a refill on Hydrochlorothiazide 25 mg. Patient stated that she is currently out of the medication. Would like for prescription to be sent to [facility] on [address].    Thanks!

## 2024-05-15 RX ORDER — HYDROCHLOROTHIAZIDE 25 MG/1
25 TABLET ORAL DAILY
Qty: 90 TABLET | Refills: 3 | Status: SHIPPED | OUTPATIENT
Start: 2024-05-15

## 2024-06-01 DIAGNOSIS — E78.2 MIXED HYPERLIPIDEMIA: ICD-10-CM

## 2024-06-01 DIAGNOSIS — E11.9 TYPE 2 DIABETES MELLITUS WITHOUT COMPLICATION, WITHOUT LONG-TERM CURRENT USE OF INSULIN (HCC): ICD-10-CM

## 2024-06-03 RX ORDER — SEMAGLUTIDE 0.68 MG/ML
INJECTION, SOLUTION SUBCUTANEOUS
Qty: 3 ML | Refills: 0 | Status: SHIPPED | OUTPATIENT
Start: 2024-06-03

## 2024-06-03 RX ORDER — AMLODIPINE BESYLATE 5 MG/1
5 TABLET ORAL DAILY
Qty: 90 TABLET | Refills: 1 | Status: SHIPPED | OUTPATIENT
Start: 2024-06-03

## 2024-06-03 RX ORDER — ATORVASTATIN CALCIUM 40 MG/1
40 TABLET, FILM COATED ORAL DAILY
Qty: 90 TABLET | Refills: 1 | Status: SHIPPED | OUTPATIENT
Start: 2024-06-03

## 2024-06-03 NOTE — TELEPHONE ENCOUNTER
Medication Refill Request    Cathie Leyva is requesting a refill of the following medication(s):   Requested Prescriptions     Pending Prescriptions Disp Refills    OZEMPIC, 0.25 OR 0.5 MG/DOSE, 2 MG/3ML SOPN [Pharmacy Med Name: Ozempic (0.25 or 0.5 MG/DOSE) 2 MG/3ML Subcutaneous Solution Pen-injector] 3 mL 0     Sig: INJECT 0.5 MG SUBCUTANEOUSLY ONCE A WEEK (EVERY  7  DAYS)    atorvastatin (LIPITOR) 40 MG tablet [Pharmacy Med Name: Atorvastatin Calcium 40 MG Oral Tablet] 90 tablet 0     Sig: Take 1 tablet by mouth once daily    amLODIPine (NORVASC) 5 MG tablet [Pharmacy Med Name: amLODIPine Besylate 5 MG Oral Tablet] 90 tablet 0     Sig: Take 1 tablet by mouth once daily        Listed PCP is Meenu Brice MD   Last provider to prescribe medication: Dr. Brice  Last Date of Medication Prescribed:   Ozempic - 04/30/2024  Atorvastatin - 11/07/2023  Amlodipine - 11/07/2023  Date of Last Office Visit at [facility]: 02/23/2024   Last Labs:      Component  Ref Range & Units 2/23/24 1201   Hemoglobin A1C, POC  % 6.6          Lab Results   Component Value Date    CHOL 197 09/13/2023    TRIG 97 09/13/2023    HDL 62 09/13/2023     (H) 09/13/2023    VLDL 17 09/13/2023     Future Appointment: No future appointments.    Please send refill to:    [address] 900 [facility] [phone] - F [phone]  900 [facility] [address]  Phone: [phone] Fax: [phone]

## 2024-07-08 RX ORDER — SEMAGLUTIDE 1.34 MG/ML
INJECTION, SOLUTION SUBCUTANEOUS
Qty: 3 ML | Refills: 0 | Status: SHIPPED | OUTPATIENT
Start: 2024-07-08

## 2024-07-08 NOTE — TELEPHONE ENCOUNTER
Medication Refill Request    Cathie Leyva is requesting a refill of the following medication(s):   Requested Prescriptions     Pending Prescriptions Disp Refills    OZEMPIC, 1 MG/DOSE, 4 MG/3ML SOPN sc injection [Pharmacy Med Name: Ozempic (1 MG/DOSE) 4 MG/3ML Subcutaneous Solution Pen-injector] 3 mL 0     Sig: INJECT 1 MG SUBCUTANEOUSLY ONCE A WEEK        Listed PCP is Meenu Brice MD   Last provider to prescribe medication:   Last Date of Medication Prescribed: 06/03/2024  Date of Last Office Visit at [facility]: 02/23/2024   Last Labs:  Component  Ref Range & Units 2/23/24 1201   Hemoglobin A1C, POC  % 6.6     Future Appointment: No future appointments.    Refill Request Note: Patient was advised to follow up in 3 months at last office visit on 02/23/2024. Message forwarded to PSR to assist with scheduling an appointment     Please send refill to:    [facility] [address] [place] [address] - 900 [facility] [phone] - F [phone]  900 [facility] [address]  Phone: [phone] Fax: [phone]      /

## 2024-08-12 RX ORDER — SEMAGLUTIDE 1.34 MG/ML
INJECTION, SOLUTION SUBCUTANEOUS
Qty: 3 ML | Refills: 0 | Status: SHIPPED | OUTPATIENT
Start: 2024-08-12

## 2024-08-16 ENCOUNTER — OFFICE VISIT (OUTPATIENT)
Age: 59
End: 2024-08-16
Payer: MEDICAID

## 2024-08-16 VITALS
RESPIRATION RATE: 16 BRPM | HEART RATE: 64 BPM | OXYGEN SATURATION: 96 % | SYSTOLIC BLOOD PRESSURE: 108 MMHG | DIASTOLIC BLOOD PRESSURE: 72 MMHG

## 2024-08-16 DIAGNOSIS — I10 ESSENTIAL (PRIMARY) HYPERTENSION: ICD-10-CM

## 2024-08-16 DIAGNOSIS — T88.7XXA SIDE EFFECT OF MEDICATION: ICD-10-CM

## 2024-08-16 DIAGNOSIS — R80.9 TYPE 2 DIABETES MELLITUS WITH DIABETIC MICROALBUMINURIA, WITHOUT LONG-TERM CURRENT USE OF INSULIN (HCC): Primary | ICD-10-CM

## 2024-08-16 DIAGNOSIS — E78.2 MIXED HYPERLIPIDEMIA: ICD-10-CM

## 2024-08-16 DIAGNOSIS — E11.29 TYPE 2 DIABETES MELLITUS WITH DIABETIC MICROALBUMINURIA, WITHOUT LONG-TERM CURRENT USE OF INSULIN (HCC): Primary | ICD-10-CM

## 2024-08-16 PROBLEM — M17.0 OSTEOARTHRITIS OF BOTH KNEES: Status: RESOLVED | Noted: 2021-10-28 | Resolved: 2024-08-16

## 2024-08-16 PROCEDURE — 99214 OFFICE O/P EST MOD 30 MIN: CPT | Performed by: FAMILY MEDICINE

## 2024-08-16 ASSESSMENT — ENCOUNTER SYMPTOMS
SINUS PRESSURE: 1
RHINORRHEA: 1
SHORTNESS OF BREATH: 0

## 2024-08-16 NOTE — PROGRESS NOTES
History of Present Illness:     Chief Complaint   Patient presents with    Diabetes       Cathie Leyva is a 59 y.o. female     Diabetes follow up  Currently on Ozempic 1mg weekly  C/o feeling more constipated, gas, heartburn  Feels it is not helping with cravings     PMH (REVIEWED):  Past Medical History:   Diagnosis Date    Adverse effect of anesthesia     slow to wake up    Diabetes (HCC)     Fibroids     High cholesterol     Hypertension     Keloid        Current Medications/Allergies (REVIEWED):     Current Outpatient Medications on File Prior to Visit   Medication Sig Dispense Refill    OZEMPIC, 1 MG/DOSE, 4 MG/3ML SOPN sc injection INJECT 1 MG SUBCUTANEOUSLY ONCE A WEEK 3 mL 0    OZEMPIC, 0.25 OR 0.5 MG/DOSE, 2 MG/3ML SOPN INJECT 0.5 MG SUBCUTANEOUSLY ONCE A WEEK (EVERY  7  DAYS) 3 mL 0    atorvastatin (LIPITOR) 40 MG tablet Take 1 tablet by mouth once daily 90 tablet 1    amLODIPine (NORVASC) 5 MG tablet Take 1 tablet by mouth once daily 90 tablet 1    hydroCHLOROthiazide (HYDRODIURIL) 25 MG tablet Take 1 tablet by mouth once daily 90 tablet 3    metFORMIN (GLUCOPHAGE-XR) 500 MG extended release tablet TAKE 1 TABLET BY MOUTH ONCE DAILY WITH  DINNER 90 tablet 1    zinc 50 MG TABS tablet Take 1 tablet by mouth daily      Lactobacillus (ACIDOPHILUS PO) Take 1 capsule by mouth daily      Probiotic Product (PROBIOTIC MULTI-ENZYME PO) Take 1 capsule by mouth daily      ascorbic acid (VITAMIN C) 1000 MG tablet Take 0.5 tablets by mouth daily      aspirin 81 MG chewable tablet Take 1 tablet by mouth daily      azelastine (ASTELIN) 0.1 % nasal spray 2 sprays by Nasal route 2 times daily ceived the following from [facility]: Outside name: azelastine (ASTELIN) 137 mcg (0.1 %) nasal spray      vitamin D (CHOLECALCIFEROL) 125 MCG (5000 UT) CAPS capsule Take 1 capsule by mouth daily      fexofenadine (ALLEGRA) 180 MG tablet Take 1 tablet by mouth daily      levocetirizine (XYZAL) 5 MG tablet Take

## 2024-08-16 NOTE — PROGRESS NOTES
Chief Complaint   Patient presents with    Diabetes       Patient identified with name and .     Vitals:    24 1024   BP: 108/72   Site: Left Upper Arm   Position: Sitting   Cuff Size: Medium Adult   Pulse: 64   Resp: 16   SpO2: 96%        1. Have you been to the ER, urgent care clinic since your last visit?  Hospitalized since your last visit?No    2. Have you seen or consulted any other health care providers outside of the [facility] since your last visit?  Include any pap smears or colon screening. No

## 2024-08-17 LAB
ALBUMIN SERPL-MCNC: 3.9 G/DL (ref 3.8–4.9)
ALP SERPL-CCNC: 148 IU/L (ref 44–121)
ALT SERPL-CCNC: 11 IU/L (ref 0–32)
AST SERPL-CCNC: 15 IU/L (ref 0–40)
BILIRUB SERPL-MCNC: 0.5 MG/DL (ref 0–1.2)
BUN SERPL-MCNC: 7 MG/DL (ref 6–24)
BUN/CREAT SERPL: 10 (ref 9–23)
CALCIUM SERPL-MCNC: 9.5 MG/DL (ref 8.7–10.2)
CHLORIDE SERPL-SCNC: 102 MMOL/L (ref 96–106)
CHOLEST SERPL-MCNC: 174 MG/DL (ref 100–199)
CO2 SERPL-SCNC: 30 MMOL/L (ref 20–29)
CREAT SERPL-MCNC: 0.67 MG/DL (ref 0.57–1)
EGFRCR SERPLBLD CKD-EPI 2021: 101 ML/MIN/1.73
GLOBULIN SER CALC-MCNC: 3.2 G/DL (ref 1.5–4.5)
GLUCOSE SERPL-MCNC: 86 MG/DL (ref 70–99)
HBA1C MFR BLD: 6.4 % (ref 4.8–5.6)
HDLC SERPL-MCNC: 67 MG/DL
LDLC SERPL CALC-MCNC: 93 MG/DL (ref 0–99)
POTASSIUM SERPL-SCNC: 3.7 MMOL/L (ref 3.5–5.2)
PROT SERPL-MCNC: 7.1 G/DL (ref 6–8.5)
SODIUM SERPL-SCNC: 144 MMOL/L (ref 134–144)
TRIGL SERPL-MCNC: 75 MG/DL (ref 0–149)
VLDLC SERPL CALC-MCNC: 14 MG/DL (ref 5–40)

## 2024-08-18 LAB
ALBUMIN/CREAT UR: 9 MG/G CREAT (ref 0–29)
CREAT UR-MCNC: 81 MG/DL
IMP & REVIEW OF LAB RESULTS: NORMAL
Lab: NORMAL
Lab: NORMAL
MICROALBUMIN UR-MCNC: 7.6 UG/ML

## 2024-08-23 ENCOUNTER — CLINICAL DOCUMENTATION (OUTPATIENT)
Age: 59
End: 2024-08-23

## 2024-08-23 NOTE — PROGRESS NOTES
Prior Authorization    Prior auth submitted via [email or web address]    Insurance Carrier/Provider:[employer] [place] - Managed Medicaid     Medication Name/ Dosage:  Tirzepatide (MOUNJARO) 2.5 MG/0.5ML     Quantity/Day Supply: 2mL/28 Days    DX: Type 2 diabetes mellitus with diabetic microalbuminuria, without long-term current use of insulin (HCC) [E11.29, R80.9]     Reference Number:  PA Case: [id number]     Outcome: Approved; 8/23/2024 12:00:00 AM, Coverage Ends on: 8/23/2025     If Denied Reason for Denial: N/A    Prior Authorization Note:   This writer spoke with [facility] [address]. Two patient identifiers verified with pharmacy tech. Claim submitted with positive outcome. Patient will be notified when RX is available for . This writer contacted patient, two patient identifiers verified with patient. Patient informed of PA Approval as noted above. Patient informed of medication strength and direction. Denies further questions or concerns. No further action required

## 2024-09-03 RX ORDER — METFORMIN HCL 500 MG
TABLET, EXTENDED RELEASE 24 HR ORAL
Qty: 90 TABLET | Refills: 1 | Status: SHIPPED | OUTPATIENT
Start: 2024-09-03

## 2024-09-03 NOTE — TELEPHONE ENCOUNTER
Medication Refill Request    Cathie Leyva is requesting a refill of the following medication(s):   Requested Prescriptions     Pending Prescriptions Disp Refills    metFORMIN (GLUCOPHAGE-XR) 500 MG extended release tablet [Pharmacy Med Name: metFORMIN HCl  MG Oral Tablet Extended Release 24 Hour] 90 tablet 0     Sig: TAKE 1 TABLET BY MOUTH ONCE DAILY WITH SUPPER        Listed PCP is Meenu Brice MD   Last provider to prescribe medication: Dr. Brice   Last Date of Medication Prescribed: 12/22/2023   Date of Last Office Visit at [facility]: 08/16/2024  Last Labs:  Hemoglobin A1C   Date Value Ref Range Status   08/16/2024 6.4 (H) 4.8 - 5.6 % Final     Comment:                 Prediabetes: 5.7 - 6.4           Diabetes: >6.4           Glycemic control for adults with diabetes: <7.0         Future Appointment: No future appointments.    Refill Request Note: Recommended to return to clinic in 3 month for medication follow up     Please send refill to:    [facility] [address] [place], [place] - 900 [facility] [phone] - F [phone]  900 [facility] [address]  Phone: [phone] Fax: [phone]

## 2024-09-06 ENCOUNTER — CLINICAL DOCUMENTATION (OUTPATIENT)
Age: 59
End: 2024-09-06

## 2024-10-01 DIAGNOSIS — E11.29 TYPE 2 DIABETES MELLITUS WITH DIABETIC MICROALBUMINURIA, WITHOUT LONG-TERM CURRENT USE OF INSULIN (HCC): ICD-10-CM

## 2024-10-01 DIAGNOSIS — R80.9 TYPE 2 DIABETES MELLITUS WITH DIABETIC MICROALBUMINURIA, WITHOUT LONG-TERM CURRENT USE OF INSULIN (HCC): ICD-10-CM

## 2024-10-02 NOTE — TELEPHONE ENCOUNTER
Medication Refill Request    Cathie Leyva is requesting a refill of the following medication(s):   Requested Prescriptions     Pending Prescriptions Disp Refills    MOUNJARO 2.5 MG/0.5ML SOPN SC injection [Pharmacy Med Name: Mounjaro 2.5 MG/0.5ML Subcutaneous Solution Pen-injector] 4 mL 0     Sig: INJECT 1/2 (ONE-HALF) ML SUBCUTANEOUSLY  ONCE A WEEK    Tirzepatide (MOUNJARO) 5 MG/0.5ML SOPN SC injection 2 mL 0     Sig: Inject 0.5 mLs into the skin once a week        Listed PCP is Meenu Brice MD   Last provider to prescribe medication: Dr. Brice  Last Date of Medication Prescribed: 08/16/2024   Date of Last Office Visit at [facility]: 08/16/2024   Last Labs:  Hemoglobin A1C   Date Value Ref Range Status   08/16/2024 6.4 (H) 4.8 - 5.6 % Final     Comment:                 Prediabetes: 5.7 - 6.4           Diabetes: >6.4           Glycemic control for adults with diabetes: <7.0       Future Appointment: No future appointments.    Refill Request Note: Recommended to follow up in 3 months for diabetes follow up       Please send refill to:    [facility] [address] [place] [address] - 900 [facility] [phone] - F [phone]  900 [facility] [address]  Phone: [phone] Fax: [phone]

## 2024-10-07 RX ORDER — TIRZEPATIDE 2.5 MG/.5ML
INJECTION, SOLUTION SUBCUTANEOUS
Qty: 4 ML | Refills: 0 | Status: SHIPPED | OUTPATIENT
Start: 2024-10-07

## 2024-10-07 RX ORDER — TIRZEPATIDE 5 MG/.5ML
5 INJECTION, SOLUTION SUBCUTANEOUS WEEKLY
Qty: 2 ML | Refills: 0 | Status: SHIPPED | OUTPATIENT
Start: 2024-10-07

## 2024-11-01 DIAGNOSIS — E11.29 TYPE 2 DIABETES MELLITUS WITH DIABETIC MICROALBUMINURIA, WITHOUT LONG-TERM CURRENT USE OF INSULIN (HCC): ICD-10-CM

## 2024-11-01 DIAGNOSIS — R80.9 TYPE 2 DIABETES MELLITUS WITH DIABETIC MICROALBUMINURIA, WITHOUT LONG-TERM CURRENT USE OF INSULIN (HCC): ICD-10-CM

## 2024-11-01 RX ORDER — TIRZEPATIDE 5 MG/.5ML
5 INJECTION, SOLUTION SUBCUTANEOUS
Qty: 2 ML | Refills: 0 | Status: SHIPPED | OUTPATIENT
Start: 2024-11-01

## 2024-11-01 NOTE — TELEPHONE ENCOUNTER
Medication Refill Request    Cathie Leyva is requesting a refill of the following medication(s):   Requested Prescriptions     Pending Prescriptions Disp Refills    MOUNJARO 5 MG/0.5ML SOAJ [Pharmacy Med Name: Mounjaro 5 MG/0.5ML Subcutaneous Solution Pen-injector] 4 mL 0     Sig: INJECT 0.5 ML SUBCUTANEOUSLY ONCE A WEEK        Listed PCP is Meenu Brice MD   Last provider to prescribe medication: Dr. Brice  Last Date of Medication Prescribed: 10/07/2024  Date of Last Office Visit at [facility]: 08/16/2024   Last Labs:  Hemoglobin A1C   Date Value Ref Range Status   08/16/2024 6.4 (H) 4.8 - 5.6 % Final     Comment:                 Prediabetes: 5.7 - 6.4           Diabetes: >6.4           Glycemic control for adults with diabetes: <7.0         Future Appointment: No future appointments.    Refill Request Note: Due for 3 month follow up      Please send refill to:    [facility] [address] [facility] 900 [facility] [phone] - F [phone]  900 [facility] [address]  Phone: [phone] Fax: [phone]

## 2024-11-14 DIAGNOSIS — E78.2 MIXED HYPERLIPIDEMIA: ICD-10-CM

## 2024-11-14 NOTE — TELEPHONE ENCOUNTER
Medication Refill Request    Cathie Leyva is requesting a refill of the following medication(s):   Requested Prescriptions     Pending Prescriptions Disp Refills    amLODIPine (NORVASC) 5 MG tablet [Pharmacy Med Name: amLODIPine Besylate 5 MG Oral Tablet] 90 tablet 0     Sig: Take 1 tablet by mouth once daily    atorvastatin (LIPITOR) 40 MG tablet [Pharmacy Med Name: Atorvastatin Calcium 40 MG Oral Tablet] 90 tablet 0     Sig: Take 1 tablet by mouth once daily        Listed PCP is Meenu Brice MD   Last provider to prescribe medication: Dr. Brice  Last Date of Medication Prescribed:   Amlodipine 06/03/204  Atorvastatin  06/03/2024  Date of Last Office Visit at [facility]: 08/16/2024     Last Labs:    Lab Results   Component Value Date    CHOL 174 08/16/2024    TRIG 75 08/16/2024    HDL 67 08/16/2024    LDL 93 08/16/2024    VLDL 14 08/16/2024      Lab Results   Component Value Date     08/16/2024    K 3.7 08/16/2024     08/16/2024    CO2 30 (H) 08/16/2024    BUN 7 08/16/2024    CREATININE 0.67 08/16/2024    GLUCOSE 86 08/16/2024    CALCIUM 9.5 08/16/2024    LABALBU 7.6 08/16/2024    BILITOT 0.5 08/16/2024    ALKPHOS 148 (H) 08/16/2024    AST 15 08/16/2024    ALT 11 08/16/2024    LABGLOM 101 08/16/2024    GFRAA 117 06/03/2021    AGRATIO 1.2 09/13/2023         Future Appointment:   Future Appointments   Date Time Provider Department Center   12/13/2024 11:00 AM Meenu Brice MD [facility] ECC DEP       Please send refill to:    [facility] [address] [place] [place] - 900 [facility] [phone] - F [phone]  900 [facility] [address]  Phone: [phone] Fax: [phone]

## 2024-11-18 RX ORDER — AMLODIPINE BESYLATE 5 MG/1
5 TABLET ORAL DAILY
Qty: 90 TABLET | Refills: 3 | Status: SHIPPED | OUTPATIENT
Start: 2024-11-18

## 2024-11-18 RX ORDER — ATORVASTATIN CALCIUM 40 MG/1
40 TABLET, FILM COATED ORAL DAILY
Qty: 90 TABLET | Refills: 3 | Status: SHIPPED | OUTPATIENT
Start: 2024-11-18

## 2024-12-06 ENCOUNTER — PATIENT MESSAGE (OUTPATIENT)
Age: 59
End: 2024-12-06

## 2024-12-06 DIAGNOSIS — E11.9 TYPE 2 DIABETES MELLITUS WITHOUT COMPLICATION, WITHOUT LONG-TERM CURRENT USE OF INSULIN (HCC): Primary | ICD-10-CM

## 2024-12-13 ENCOUNTER — OFFICE VISIT (OUTPATIENT)
Age: 59
End: 2024-12-13
Payer: MEDICAID

## 2024-12-13 VITALS
HEART RATE: 74 BPM | WEIGHT: 284.4 LBS | SYSTOLIC BLOOD PRESSURE: 120 MMHG | BODY MASS INDEX: 52.33 KG/M2 | HEIGHT: 62 IN | OXYGEN SATURATION: 96 % | DIASTOLIC BLOOD PRESSURE: 73 MMHG | RESPIRATION RATE: 17 BRPM

## 2024-12-13 DIAGNOSIS — E11.9 TYPE 2 DIABETES MELLITUS WITHOUT COMPLICATION, WITHOUT LONG-TERM CURRENT USE OF INSULIN (HCC): Primary | ICD-10-CM

## 2024-12-13 DIAGNOSIS — E66.01 MORBID OBESITY WITH BMI OF 50.0-59.9, ADULT: ICD-10-CM

## 2024-12-13 DIAGNOSIS — R60.0 LIPEDEMA OF LOWER EXTREMITY: ICD-10-CM

## 2024-12-13 DIAGNOSIS — M62.838 NECK MUSCLE SPASM: ICD-10-CM

## 2024-12-13 DIAGNOSIS — I10 ESSENTIAL (PRIMARY) HYPERTENSION: ICD-10-CM

## 2024-12-13 DIAGNOSIS — R35.0 URINARY FREQUENCY: ICD-10-CM

## 2024-12-13 LAB
BILIRUBIN, URINE, POC: NEGATIVE
BLOOD URINE, POC: NEGATIVE
GLUCOSE URINE, POC: NEGATIVE
KETONES, URINE, POC: NEGATIVE
LEUKOCYTE ESTERASE, URINE, POC: NEGATIVE
NITRITE, URINE, POC: NEGATIVE
PH, URINE, POC: 6 (ref 4.6–8)
PROTEIN,URINE, POC: NEGATIVE
SPECIFIC GRAVITY, URINE, POC: 1.02 (ref 1–1.03)
URINALYSIS CLARITY, POC: CLEAR
URINALYSIS COLOR, POC: NORMAL
UROBILINOGEN, POC: NORMAL

## 2024-12-13 PROCEDURE — 3078F DIAST BP <80 MM HG: CPT | Performed by: FAMILY MEDICINE

## 2024-12-13 PROCEDURE — 3044F HG A1C LEVEL LT 7.0%: CPT | Performed by: FAMILY MEDICINE

## 2024-12-13 PROCEDURE — 3074F SYST BP LT 130 MM HG: CPT | Performed by: FAMILY MEDICINE

## 2024-12-13 PROCEDURE — PBSHW AMB POC URINALYSIS DIP STICK AUTO W/O MICRO: Performed by: FAMILY MEDICINE

## 2024-12-13 PROCEDURE — 99214 OFFICE O/P EST MOD 30 MIN: CPT | Performed by: FAMILY MEDICINE

## 2024-12-13 PROCEDURE — 81003 URINALYSIS AUTO W/O SCOPE: CPT | Performed by: FAMILY MEDICINE

## 2024-12-13 RX ORDER — DOXYCYCLINE 100 MG/1
100 CAPSULE ORAL DAILY
COMMUNITY

## 2024-12-13 RX ORDER — CLOBETASOL PROPIONATE 0.5 MG/G
OINTMENT TOPICAL
COMMUNITY

## 2024-12-13 ASSESSMENT — PATIENT HEALTH QUESTIONNAIRE - PHQ9
SUM OF ALL RESPONSES TO PHQ QUESTIONS 1-9: 0
SUM OF ALL RESPONSES TO PHQ QUESTIONS 1-9: 0
1. LITTLE INTEREST OR PLEASURE IN DOING THINGS: NOT AT ALL
2. FEELING DOWN, DEPRESSED OR HOPELESS: NOT AT ALL
SUM OF ALL RESPONSES TO PHQ9 QUESTIONS 1 & 2: 0
SUM OF ALL RESPONSES TO PHQ QUESTIONS 1-9: 0
SUM OF ALL RESPONSES TO PHQ QUESTIONS 1-9: 0

## 2024-12-13 ASSESSMENT — ENCOUNTER SYMPTOMS
SHORTNESS OF BREATH: 0
CONSTIPATION: 1

## 2024-12-13 NOTE — PROGRESS NOTES
History of Present Illness:     Chief Complaint   Patient presents with    3 Month Follow-Up     - right foot still hurts      -occasional shooting pain      -sore along side the edges  - not sleeping well  - retaining fluid in legs  - polyuria  - muscle tightness       Cathie Leyva is a 59 y.o. female     Diabetes follow up  Switched to Mounjaro on 5mg weekly  No significant constipation  She does have Mounjaro 7.5mg weekly      Urinary frequency, ongoing  Never followed up with PT for pelvic floor PT    Continues to have swelling in the legs     Neck pain, would like to go to PT    PMH (REVIEWED):  Past Medical History:   Diagnosis Date    Adverse effect of anesthesia     slow to wake up    Diabetes (HCC)     Fibroids     High cholesterol     Hypertension     Keloid     Obesity     Type 2 diabetes mellitus without complication (HCC)        Current Medications/Allergies (REVIEWED):     Current Outpatient Medications on File Prior to Visit   Medication Sig Dispense Refill    doxycycline monohydrate (MONODOX) 100 MG capsule Take 1 capsule by mouth daily      clobetasol (TEMOVATE) 0.05 % ointment APPLY 1 APPLICATION TOPICALLY ONCE DAILY MONDAY-FRIDAY ONLY      Minoxidil 5 % FOAM Apply 1 Application topically daily      amLODIPine (NORVASC) 5 MG tablet Take 1 tablet by mouth once daily 90 tablet 3    atorvastatin (LIPITOR) 40 MG tablet Take 1 tablet by mouth once daily 90 tablet 3    Tirzepatide (MOUNJARO) 5 MG/0.5ML SOAJ Inject 5 mg into the skin every 7 days 2 mL 0    metFORMIN (GLUCOPHAGE-XR) 500 MG extended release tablet TAKE 1 TABLET BY MOUTH ONCE DAILY WITH SUPPER 90 tablet 1    hydroCHLOROthiazide (HYDRODIURIL) 25 MG tablet Take 1 tablet by mouth once daily 90 tablet 3    zinc 50 MG TABS tablet Take 1 tablet by mouth daily      Probiotic Product (PROBIOTIC MULTI-ENZYME PO) Take 1 capsule by mouth daily      MAGNESIUM PO Take 400 mg by mouth daily      ascorbic acid (VITAMIN C) 1000 MG tablet Take 0.5

## 2024-12-13 NOTE — PROGRESS NOTES
Cathie Leyva is a 59 y.o. female      Chief Complaint   Patient presents with    3 Month Follow-Up     - right foot still hurts      -occasional shooting pain      -sore along side the edges  - not sleeping well  - retaining fluid in legs  - polyuria  - muscle tightness       \"Have you been to the ER, urgent care clinic since your last visit?  Hospitalized since your last visit?\"    NO    “Have you seen or consulted any other health care providers outside of [facility] since your last visit?”    NO              Vitals:    12/13/24 1125   BP: 120/73   Site: Left Wrist   Position: Sitting   Cuff Size: Small Adult   Pulse: 74   Resp: 17   TempSrc: Oral   SpO2: 96%   Weight: 129 kg (284 lb 6.4 oz)   Height: 1.575 m (5' 2.01\")            Health Maintenance Due   Topic Date Due    Diabetic retinal exam  04/08/2022    Flu vaccine (1) 08/01/2024    COVID-19 Vaccine (5 - 2023-24 season) 09/01/2024         Medication Reconciliation completed, changes noted.  Please  Update medication list.

## 2025-01-11 DIAGNOSIS — E11.9 TYPE 2 DIABETES MELLITUS WITHOUT COMPLICATION, WITHOUT LONG-TERM CURRENT USE OF INSULIN (HCC): ICD-10-CM

## 2025-01-13 NOTE — TELEPHONE ENCOUNTER
Medication Refill Request    Cathie Leyva is requesting a refill of the following medication(s):   Requested Prescriptions     Pending Prescriptions Disp Refills    MOUNJARO 7.5 MG/0.5ML SOAJ [Pharmacy Med Name: Mounjaro 7.5 MG/0.5ML Subcutaneous Solution Pen-injector] 4 mL 0     Sig: INJECT 1 SYRINGE SUBCUTANEOUSLY ONCE A WEEK (EVERY  7  DAYS)        Listed PCP is Meenu Brice MD   Last provider to prescribe medication: Meenu Brice MD   Last Date of Medication Prescribed: 12.09.24   Date of Last Office Visit at [facility]: 12.13.24   FUTURE APPOINTMENT: No future appointments.    Please send refill to:    [facility] [address] [place] [address] - 900 [facility] [phone] - F [phone]  900 [facility] [address]  Phone: [phone] Fax: [phone]      Please review request and approve or deny with recommendations.

## 2025-02-05 ENCOUNTER — PATIENT MESSAGE (OUTPATIENT)
Age: 60
End: 2025-02-05

## 2025-02-05 DIAGNOSIS — E11.9 TYPE 2 DIABETES MELLITUS WITHOUT COMPLICATION, WITHOUT LONG-TERM CURRENT USE OF INSULIN (HCC): ICD-10-CM

## 2025-02-07 ENCOUNTER — PATIENT MESSAGE (OUTPATIENT)
Age: 60
End: 2025-02-07

## 2025-02-07 DIAGNOSIS — E11.9 TYPE 2 DIABETES MELLITUS WITHOUT COMPLICATION, WITHOUT LONG-TERM CURRENT USE OF INSULIN (HCC): Primary | ICD-10-CM

## 2025-02-12 ENCOUNTER — HOSPITAL ENCOUNTER (OUTPATIENT)
Facility: HOSPITAL | Age: 60
Setting detail: RECURRING SERIES
Discharge: HOME | End: 2025-02-15
Payer: MEDICAID

## 2025-02-12 PROCEDURE — 97535 SELF CARE MNGMENT TRAINING: CPT

## 2025-02-12 PROCEDURE — 97162 PT EVAL MOD COMPLEX 30 MIN: CPT

## 2025-02-12 NOTE — THERAPY EVALUATION
Physical Therapy at [place],   a part of [address], [address]  [place], [place] [address]  Phone: [phone]  Fax: [phone]     PHYSICAL THERAPY - MEDICAID EVALUATION/PLAN OF CARE NOTE (updated 3/23)    Date: 2025        Patient Name:  Cathie Leyva :  1965   Medical   Diagnosis:  Urge incontinence [N39.41] Treatment Diagnosis:  M54.2  NECK PAIN and M54.59  OTHER LOWER BACK PAIN  and M62.89  OTHER SPECIFIED DISORDERS OF MUSCLE and N39.41  URGE INCONTINENCE    Referral Source:  Meenu Brice MD Provider #:  [id number]                Insurance: Payor: [facility] MEDICAID / Plan: [facility] HEALTHKEEPERS PLUS / Product Type: *No Product type* /      Patient  verified yes     Visit #   Current  / Total 1 8   Time   In / Out 150p 245p   Total Treatment Time 55   Total Timed Codes 25   1:1 Treatment Time 25    [facility] Totals Reminder:  bill using total billable   min of TIMED therapeutic procedures and modalities.   8-22 min = 1 unit; 23-37 min = 2 units; 38-52 min = 3 units;  53-67 min = 4 units; 68-82 min = 5 units     SUBJECTIVE  Pain Level (0-10 scale): 0  []constant []intermittent []improving []worsening []no change since onset    Any medication changes, allergies to medications, adverse drug reactions, diagnosis change, or new procedure performed?: [x] No    [] Yes (see summary sheet for update)  Medications: Verified on Patient Summary List    Subjective functional status/changes:       \"The neck is just occasional\". Intermittent spasms \"stiffness... comes and goes\".     Has had UUI for about a year. Has frequency too. Bothering her at night and during the day. Worst is at night. Drinks about 32 oz of water, 20 oz mountain dew, occasional sweet tea, mushroom coffee. Every time she walks to the bathroom has leakage- sometimes doesn't even feel it leaking as she's walking. Leans forward to completely empty sometimes. Denies

## 2025-02-25 ENCOUNTER — APPOINTMENT (OUTPATIENT)
Facility: HOSPITAL | Age: 60
End: 2025-02-25
Payer: MEDICAID

## 2025-03-10 RX ORDER — METFORMIN HYDROCHLORIDE 500 MG/1
500 TABLET, EXTENDED RELEASE ORAL
Qty: 90 TABLET | Refills: 3 | Status: SHIPPED | OUTPATIENT
Start: 2025-03-10

## 2025-03-10 RX ORDER — METFORMIN HYDROCHLORIDE 500 MG/1
TABLET, EXTENDED RELEASE ORAL
Qty: 90 TABLET | Refills: 0 | OUTPATIENT
Start: 2025-03-10

## 2025-03-10 NOTE — TELEPHONE ENCOUNTER
Pt called and stated that she was the pharmacy, waiting for you to send the Rx. I informed her that the request was received at 12:12 and medications can take up to 48 business hours. She asked that a message is sent to you to send refill and she will wait at the pharmacy for \"a while.\"

## 2025-03-10 NOTE — TELEPHONE ENCOUNTER
Medication Refill Request    Cathie Leyva is requesting a refill of the following medication(s):   Requested Prescriptions     Pending Prescriptions Disp Refills    metFORMIN (GLUCOPHAGE-XR) 500 MG extended release tablet 90 tablet 1        Listed PCP is Meenu Brice MD   Last provider to prescribe medication: Dr. Brice on 9/3/24  Date of Last Office Visit at [facility]: 12/13/2024 with Dr. Brice    FUTURE [facility] APPOINTMENT: 3/10/2025    Please send refill to:    [facility] [address] [place] [address] - 900 [facility] [phone] - F [phone]  900 [facility] [address]  Phone: [phone] Fax: [phone]      Please review request and approve or deny with recommendations within 48 hours.

## 2025-03-24 ENCOUNTER — HOSPITAL ENCOUNTER (OUTPATIENT)
Facility: HOSPITAL | Age: 60
Setting detail: RECURRING SERIES
Discharge: HOME | End: 2025-03-27
Payer: MEDICAID

## 2025-03-24 PROCEDURE — 97535 SELF CARE MNGMENT TRAINING: CPT

## 2025-03-24 PROCEDURE — 97110 THERAPEUTIC EXERCISES: CPT

## 2025-03-24 NOTE — PROGRESS NOTES
PHYSICAL THERAPY - MEDICARE DAILY TREATMENT NOTE (updated 3/23)    Date: 3/24/2025        Patient Name:  Cathie Leyva :  1965   Medical   Diagnosis:  Urge incontinence [N39.41] Treatment Diagnosis:  M54.2  NECK PAIN and M54.59  OTHER LOWER BACK PAIN  and M62.89  OTHER SPECIFIED DISORDERS OF MUSCLE and N39.41  URGE INCONTINENCE    Referral Source:  Meenu Brice MD Insurance:   Payor: [facility] MEDICAID / Plan: [facility] HEALTHKEEPERS PLUS / Product Type: *No Product type* /                   Patient  verified yes     Visit #   Current  / Total 2 8   Time   In / Out 215p 300p   Total Treatment Time 45   Total Timed Codes 45   1:1 Treatment Time 45      [facility] Totals Reminder:  bill using total billable   min of TIMED therapeutic procedures and modalities.   8-22 min = 1 unit; 23-37 min = 2 units; 38-52 min = 3 units; 53-67 min = 4 units; 68-82 min = 5 units        SUBJECTIVE    Pain Level (0-10 scale): 0    Any medication changes, allergies to medications, adverse drug reactions, diagnosis change, or new procedure performed?: [x] No    [] Yes (see summary sheet for update)  Medications: Verified on Patient Summary List    Subjective functional status/changes:       PFC urge drills didn't help much, forgot heel raises. If she waits 4 hours she has UUI, 2-3 hours is good. Gets up 4 times at night, better if she limits intake before bed. Feeling pressure at rectum to mid thigh when she needs to have a BM. Tried squatty potty a little, turning to R helped with ease of BMs. Neck is fine.     OBJECTIVE    Therapeutic Procedures:  Tx Min Billable or 1:1 Min (if diff from Tx Min) Procedure, Rationale, Specifics   25  75944 Self Care/Home Management (timed):  improve patient knowledge and understanding of home injury/symptom/pain management and activity modification  to improve patient's ability to progress to PLOF and address remaining functional goals.  (see flow sheet as applicable)     Details if

## 2025-03-30 DIAGNOSIS — E11.9 TYPE 2 DIABETES MELLITUS WITHOUT COMPLICATION, WITHOUT LONG-TERM CURRENT USE OF INSULIN: ICD-10-CM

## 2025-04-01 ENCOUNTER — HOSPITAL ENCOUNTER (OUTPATIENT)
Facility: HOSPITAL | Age: 60
Setting detail: RECURRING SERIES
Discharge: HOME | End: 2025-04-04
Payer: MEDICAID

## 2025-04-01 PROCEDURE — 97110 THERAPEUTIC EXERCISES: CPT

## 2025-04-01 PROCEDURE — 97112 NEUROMUSCULAR REEDUCATION: CPT

## 2025-04-01 PROCEDURE — 97535 SELF CARE MNGMENT TRAINING: CPT

## 2025-04-01 RX ORDER — TIRZEPATIDE 10 MG/.5ML
INJECTION, SOLUTION SUBCUTANEOUS
Qty: 4 ML | Refills: 0 | Status: SHIPPED | OUTPATIENT
Start: 2025-04-01

## 2025-04-01 RX ORDER — TIRZEPATIDE 7.5 MG/.5ML
INJECTION, SOLUTION SUBCUTANEOUS
Qty: 4 ML | Refills: 0 | Status: SHIPPED | OUTPATIENT
Start: 2025-04-01

## 2025-04-01 NOTE — PROGRESS NOTES
PHYSICAL THERAPY - MEDICARE DAILY TREATMENT NOTE (updated 3/23)    Date: 2025        Patient Name:  Cathie Leyva :  1965   Medical   Diagnosis:  Urge incontinence [N39.41] Treatment Diagnosis:  M54.2  NECK PAIN and M54.59  OTHER LOWER BACK PAIN  and M62.89  OTHER SPECIFIED DISORDERS OF MUSCLE and N39.41  URGE INCONTINENCE    Referral Source:  Meenu Brice MD Insurance:   Payor: [facility] MEDICAID / Plan: [facility] HEALTHKEEPERS PLUS / Product Type: *No Product type* /                   Patient  verified yes     Visit #   Current  / Total 3 8   Time   In / Out 245p 326p   Total Treatment Time 41   Total Timed Codes 41   1:1 Treatment Time 41      [facility] Totals Reminder:  bill using total billable   min of TIMED therapeutic procedures and modalities.   8-22 min = 1 unit; 23-37 min = 2 units; 38-52 min = 3 units; 53-67 min = 4 units; 68-82 min = 5 units        SUBJECTIVE    Pain Level (0-10 scale): 0    Any medication changes, allergies to medications, adverse drug reactions, diagnosis change, or new procedure performed?: [x] No    [] Yes (see summary sheet for update)  Medications: Verified on Patient Summary List    Subjective functional status/changes:       Heel raise urge drills helped manage urgency frequency. If she waits 4 hours she has UUI, 2-3 hours is good. Gets up 4 times at night. Has had more frequent BMs and is keeping up with bowel massage. Feeling pressure at rectum to mid thigh when she needs to have a BM- better with stretches.     OBJECTIVE    Therapeutic Procedures:  Tx Min Billable or 1:1 Min (if diff from Tx Min) Procedure, Rationale, Specifics   15  80775 Self Care/Home Management (timed):  improve patient knowledge and understanding of home injury/symptom/pain management and activity modification  to improve patient's ability to progress to PLOF and address remaining functional goals.  (see flow sheet as applicable)     Details if applicable: voiding schedule,

## 2025-04-10 ENCOUNTER — APPOINTMENT (OUTPATIENT)
Facility: HOSPITAL | Age: 60
End: 2025-04-10
Payer: MEDICAID

## 2025-04-17 ENCOUNTER — TELEPHONE (OUTPATIENT)
Age: 60
End: 2025-04-17

## 2025-04-17 DIAGNOSIS — E11.9 TYPE 2 DIABETES MELLITUS WITHOUT COMPLICATION, WITHOUT LONG-TERM CURRENT USE OF INSULIN: ICD-10-CM

## 2025-04-17 NOTE — TELEPHONE ENCOUNTER
Pt requesting refill for MOUNJARO 10 MG/0.5ML SOAJ    [facility] [address] [place] [address] NICKY CHAVEZ [phone] - F [phone]

## 2025-04-18 RX ORDER — TIRZEPATIDE 10 MG/.5ML
10 INJECTION, SOLUTION SUBCUTANEOUS
Qty: 2 ML | Refills: 0 | Status: SHIPPED | OUTPATIENT
Start: 2025-04-18

## 2025-04-18 NOTE — TELEPHONE ENCOUNTER
Medication Refill Request    Cathie Leyva is requesting a refill of the following medication(s):   Requested Prescriptions     Pending Prescriptions Disp Refills    Tirzepatide (MOUNJARO) 10 MG/0.5ML SOAJ 4 mL 0        Listed PCP is Meenu Brice MD   Last provider to prescribe medication: Meenu Brice MD  Last Date of Medication Prescribed: 04.01.25   Date of Last Office Visit at [facility]: 12.13.24   FUTURE APPOINTMENT:   Future Appointments   Date Time Provider Department Center   4/22/2025  2:45 PM Guera Santamaria, PT [facility]   5/9/2025  1:00 PM Meenu Brice MD [facility] ECC DEP       Please send refill to:    [facility] [address] [place], [place] - 900 [facility] [phone] - F [phone]  900 [facility] [address]  Phone: [phone] Fax: [phone]      Please review request and approve or deny with recommendations.

## 2025-04-18 NOTE — TELEPHONE ENCOUNTER
I attempted to reach patient twice but was not successful. Left a VM to give the clinic a call back to discuss medication refill.

## 2025-04-28 ENCOUNTER — TRANSCRIBE ORDERS (OUTPATIENT)
Facility: HOSPITAL | Age: 60
End: 2025-04-28

## 2025-04-28 DIAGNOSIS — Z12.31 VISIT FOR SCREENING MAMMOGRAM: Primary | ICD-10-CM

## 2025-05-09 ENCOUNTER — OFFICE VISIT (OUTPATIENT)
Age: 60
End: 2025-05-09
Payer: MEDICAID

## 2025-05-09 ENCOUNTER — HOSPITAL ENCOUNTER (OUTPATIENT)
Facility: HOSPITAL | Age: 60
Discharge: HOME | End: 2025-05-12
Attending: FAMILY MEDICINE
Payer: MEDICAID

## 2025-05-09 VITALS
OXYGEN SATURATION: 96 % | HEIGHT: 62 IN | TEMPERATURE: 98.9 F | RESPIRATION RATE: 16 BRPM | DIASTOLIC BLOOD PRESSURE: 89 MMHG | HEART RATE: 91 BPM | WEIGHT: 271 LBS | BODY MASS INDEX: 49.87 KG/M2 | SYSTOLIC BLOOD PRESSURE: 124 MMHG

## 2025-05-09 VITALS — BODY MASS INDEX: 52.26 KG/M2 | WEIGHT: 284 LBS | HEIGHT: 62 IN

## 2025-05-09 DIAGNOSIS — I10 ESSENTIAL (PRIMARY) HYPERTENSION: ICD-10-CM

## 2025-05-09 DIAGNOSIS — E11.9 TYPE 2 DIABETES MELLITUS WITHOUT COMPLICATION, WITHOUT LONG-TERM CURRENT USE OF INSULIN (HCC): Primary | ICD-10-CM

## 2025-05-09 DIAGNOSIS — N32.81 OVERACTIVE BLADDER: ICD-10-CM

## 2025-05-09 DIAGNOSIS — Z12.31 VISIT FOR SCREENING MAMMOGRAM: ICD-10-CM

## 2025-05-09 DIAGNOSIS — E66.01 MORBID OBESITY WITH BMI OF 50.0-59.9, ADULT (HCC): ICD-10-CM

## 2025-05-09 DIAGNOSIS — T88.7XXA SIDE EFFECT OF MEDICATION: ICD-10-CM

## 2025-05-09 PROCEDURE — 77063 BREAST TOMOSYNTHESIS BI: CPT

## 2025-05-09 PROCEDURE — 3074F SYST BP LT 130 MM HG: CPT | Performed by: FAMILY MEDICINE

## 2025-05-09 PROCEDURE — 99214 OFFICE O/P EST MOD 30 MIN: CPT | Performed by: FAMILY MEDICINE

## 2025-05-09 PROCEDURE — 3079F DIAST BP 80-89 MM HG: CPT | Performed by: FAMILY MEDICINE

## 2025-05-09 PROCEDURE — G2211 COMPLEX E/M VISIT ADD ON: HCPCS | Performed by: FAMILY MEDICINE

## 2025-05-09 RX ORDER — OXYBUTYNIN CHLORIDE 5 MG/1
5 TABLET, EXTENDED RELEASE ORAL DAILY
Qty: 30 TABLET | Refills: 3 | Status: SHIPPED | OUTPATIENT
Start: 2025-05-09

## 2025-05-09 RX ORDER — HYDROCHLOROTHIAZIDE 12.5 MG/1
12.5 TABLET ORAL DAILY
Qty: 90 TABLET | Refills: 1 | Status: SHIPPED | OUTPATIENT
Start: 2025-05-09

## 2025-05-09 SDOH — ECONOMIC STABILITY: INCOME INSECURITY: IN THE LAST 12 MONTHS, WAS THERE A TIME WHEN YOU WERE NOT ABLE TO PAY THE MORTGAGE OR RENT ON TIME?: PATIENT DECLINED

## 2025-05-09 SDOH — ECONOMIC STABILITY: FOOD INSECURITY: WITHIN THE PAST 12 MONTHS, YOU WORRIED THAT YOUR FOOD WOULD RUN OUT BEFORE YOU GOT MONEY TO BUY MORE.: PATIENT DECLINED

## 2025-05-09 SDOH — ECONOMIC STABILITY: TRANSPORTATION INSECURITY
IN THE PAST 12 MONTHS, HAS THE LACK OF TRANSPORTATION KEPT YOU FROM MEDICAL APPOINTMENTS OR FROM GETTING MEDICATIONS?: NO

## 2025-05-09 SDOH — ECONOMIC STABILITY: FOOD INSECURITY: WITHIN THE PAST 12 MONTHS, THE FOOD YOU BOUGHT JUST DIDN'T LAST AND YOU DIDN'T HAVE MONEY TO GET MORE.: PATIENT DECLINED

## 2025-05-09 ASSESSMENT — ENCOUNTER SYMPTOMS
CONSTIPATION: 0
SHORTNESS OF BREATH: 0
ABDOMINAL PAIN: 0

## 2025-05-09 ASSESSMENT — PATIENT HEALTH QUESTIONNAIRE - PHQ9
SUM OF ALL RESPONSES TO PHQ QUESTIONS 1-9: 2
2. FEELING DOWN, DEPRESSED OR HOPELESS: SEVERAL DAYS
1. LITTLE INTEREST OR PLEASURE IN DOING THINGS: SEVERAL DAYS

## 2025-05-09 NOTE — PROGRESS NOTES
Cathie Leyva is a 59 y.o. female      Chief Complaint   Patient presents with    Diabetes     - question regarding bowel movements  - pain in lower back that radiates to mid thigh (back)         \"Have you been to the ER, urgent care clinic since your last visit?  Hospitalized since your last visit?\"    NO    “Have you seen or consulted any other health care providers outside of [facility] since your last visit?”    NO              Vitals:    05/09/25 1312   BP: 124/89   BP Site: Right Upper Arm   Patient Position: Sitting   BP Cuff Size: Large Adult   Pulse: 91   Resp: 16   Temp: 98.9 °F (37.2 °C)   TempSrc: Temporal   SpO2: 96%   Weight: 122.9 kg (271 lb)   Height: 1.575 m (5' 2\")            Health Maintenance Due   Topic Date Due    Diabetic retinal exam  04/08/2022    Breast cancer screen  04/30/2025         Medication Reconciliation completed, changes noted.  Please  Update medication list.

## 2025-05-09 NOTE — PROGRESS NOTES
History of Present Illness:     Chief Complaint   Patient presents with    Diabetes     - question regarding bowel movements  - pain in lower back that radiates to mid thigh (back)         Cathie Leyva is a 59 y.o. female     History of Present Illness  The patient is here today to follow up for her diabetes, high blood pressure, and medications.    She reports a perceived improvement in her condition with Mounjaro, noting a decrease in weight. She has been on a 10 mg dose of Mounjaro for the third time. She expresses concern about potential weight gain due to the transition from Ozempic to Mounjaro. Despite these improvements, she reports that Mounjaro does not adequately control her appetite, leading to overeating out of boredom. She has not yet incorporated physical activity into her routine but plans to start walking with her cousin. She has noticed weight loss in her shoulders and plans to incorporate weight resistance exercises into her routine. She has been doing arm exercises but feels they are not effective. She has some 2-pound weights at home and plans to resume using them.     She has been keeping a chart of her weight, which was 284 pounds at the last visit and is currently 271 pounds. Her highest recorded weight was 307 pounds.     She experiences bowel movements every 3 to 4 days and has observed that consumption of greasy or excessive food leads to reflux or stomach discomfort.    She has been experiencing frequent urination, which disrupts her sleep, leading to daytime sleepiness. She has been advised to consume at least three bottles of water daily, which she attempts to do earlier in the day, typically ceasing intake by 7 PM. She takes hydrochlorothiazide early in the morning upon waking. She has not previously tried any medication for bladder issues. She does not experience urinary incontinence during sneezing or coughing. She has been attending pelvic floor therapy and has been advised

## 2025-05-10 LAB
BUN SERPL-MCNC: 7 MG/DL (ref 6–24)
BUN/CREAT SERPL: 10 (ref 9–23)
CALCIUM SERPL-MCNC: 9.2 MG/DL (ref 8.7–10.2)
CHLORIDE SERPL-SCNC: 99 MMOL/L (ref 96–106)
CO2 SERPL-SCNC: 26 MMOL/L (ref 20–29)
CREAT SERPL-MCNC: 0.67 MG/DL (ref 0.57–1)
EGFRCR SERPLBLD CKD-EPI 2021: 101 ML/MIN/1.73
GLUCOSE SERPL-MCNC: 87 MG/DL (ref 70–99)
HBA1C MFR BLD: 6.2 % (ref 4.8–5.6)
POTASSIUM SERPL-SCNC: 3.8 MMOL/L (ref 3.5–5.2)
SODIUM SERPL-SCNC: 139 MMOL/L (ref 134–144)

## 2025-05-12 ENCOUNTER — RESULTS FOLLOW-UP (OUTPATIENT)
Age: 60
End: 2025-05-12

## 2025-07-10 ENCOUNTER — PATIENT MESSAGE (OUTPATIENT)
Age: 60
End: 2025-07-10

## 2025-07-10 DIAGNOSIS — E11.9 TYPE 2 DIABETES MELLITUS WITHOUT COMPLICATION, WITHOUT LONG-TERM CURRENT USE OF INSULIN (HCC): Primary | ICD-10-CM

## 2025-07-24 ENCOUNTER — OFFICE VISIT (OUTPATIENT)
Age: 60
End: 2025-07-24
Payer: MEDICAID

## 2025-07-24 VITALS
TEMPERATURE: 97.9 F | RESPIRATION RATE: 18 BRPM | SYSTOLIC BLOOD PRESSURE: 131 MMHG | BODY MASS INDEX: 50.97 KG/M2 | HEIGHT: 62 IN | OXYGEN SATURATION: 96 % | WEIGHT: 277 LBS | HEART RATE: 69 BPM | DIASTOLIC BLOOD PRESSURE: 77 MMHG

## 2025-07-24 DIAGNOSIS — E11.9 TYPE 2 DIABETES MELLITUS WITHOUT COMPLICATION, WITHOUT LONG-TERM CURRENT USE OF INSULIN (HCC): Primary | ICD-10-CM

## 2025-07-24 DIAGNOSIS — I10 PRIMARY HYPERTENSION: ICD-10-CM

## 2025-07-24 DIAGNOSIS — R53.83 OTHER FATIGUE: ICD-10-CM

## 2025-07-24 DIAGNOSIS — T88.7XXA SIDE EFFECT OF MEDICATION: ICD-10-CM

## 2025-07-24 DIAGNOSIS — R60.0 LOWER LEG EDEMA: ICD-10-CM

## 2025-07-24 PROCEDURE — 3044F HG A1C LEVEL LT 7.0%: CPT | Performed by: FAMILY MEDICINE

## 2025-07-24 PROCEDURE — 3078F DIAST BP <80 MM HG: CPT | Performed by: FAMILY MEDICINE

## 2025-07-24 PROCEDURE — 3075F SYST BP GE 130 - 139MM HG: CPT | Performed by: FAMILY MEDICINE

## 2025-07-24 PROCEDURE — G2211 COMPLEX E/M VISIT ADD ON: HCPCS | Performed by: FAMILY MEDICINE

## 2025-07-24 PROCEDURE — 99214 OFFICE O/P EST MOD 30 MIN: CPT | Performed by: FAMILY MEDICINE

## 2025-07-24 RX ORDER — VALSARTAN 80 MG/1
80 TABLET ORAL DAILY
Qty: 90 TABLET | Refills: 1 | Status: SHIPPED | OUTPATIENT
Start: 2025-07-24

## 2025-07-24 ASSESSMENT — ENCOUNTER SYMPTOMS: ABDOMINAL PAIN: 0

## 2025-07-24 NOTE — PROGRESS NOTES
Identified pt with two pt identifiers(name and ). Reviewed record in preparation for visit and have obtained necessary documentation.  Chief Complaint   Patient presents with    Diabetes      Would like to speak about something for swelling and med change possible.    Health Maintenance Due   Topic    Diabetic retinal exam     Diabetic foot exam     Diabetic Alb to Cr ratio (uACR) test     Lipids        Vitals:    25 1342   BP: 131/77   BP Site: Right Upper Arm   Patient Position: Sitting   BP Cuff Size: Large Adult   Pulse: 69   Resp: 18   Temp: 97.9 °F (36.6 °C)   TempSrc: Oral   SpO2: 96%   Weight: 125.6 kg (277 lb)   Height: 1.575 m (5' 2\")         \"Have you been to the ER, urgent care clinic since your last visit?  Hospitalized since your last visit?\"    NO    “Have you seen or consulted any other health care providers outside of [facility] since your last visit?”    NO            Click Here for Release of Records Request     This patient is accompanied in the office by her self.  I have received verbal consent from Cathie Leyva to discuss any/all medical information while they are present in the room.  
diabetes follow-up.  Treatment plan: Continue Mounjaro 12.5 mg, consider increasing to 15 mg if tolerated.  Clinical decision making: None.    Hypertension  Blood pressure readings satisfactory.  Treatment plan: Discontinue amlodipine due to leg swelling. Initiate Valsartan 80 mg once daily. Report symptoms of lightheadedness or dizziness.  Clinical decision making: None.    Fatigue  Reports constant tiredness, taking over-the-counter B12 supplements. No energy, barely getting through daily activities. Routine disruption contributing to fatigue.  Work up: Check blood counts and vitamin levels today.  Treatment plan: None.  Clinical decision making: None.    Allergies  Reports coughing up clear mucus, itching around eyes. Using two nasal sprays and Pataday eye drops.  Work up: Allergist appointment in 2 weeks.  Treatment plan: None.  Clinical decision making: Behind on allergy shots, may contribute to symptoms.    Leg Swelling  Experiences leg swelling, worsens throughout day. Swelling may be related to amlodipine, which will be discontinued.  Work up: Referral to lymphedema clinic for further evaluation and management.  Treatment plan: Compression socks or stockings may be needed.  Clinical decision making: None.    Follow-up: Patient will follow up in a few months.      Meenu Brice MD    We discussed the expected course, resolution and complications of the diagnosis(es) in detail.  Medication risks, benefits, costs, interactions, and alternatives were discussed as indicated.  I advised her to contact the office if her condition worsens, changes or fails to improve as anticipated. She expressed understanding with the diagnosis(es) and plan.     The patient (or guardian, if applicable) and other individuals in attendance with the patient were advised that Artificial Intelligence will be utilized during this visit to record, process the conversation to generate a clinical note, and support improvement of the AI

## 2025-07-25 LAB
25(OH)D3+25(OH)D2 SERPL-MCNC: 47 NG/ML (ref 30–100)
ALBUMIN/CREAT UR: <5 MG/G CREAT (ref 0–29)
CHOLEST SERPL-MCNC: 191 MG/DL (ref 100–199)
CREAT UR-MCNC: 60.8 MG/DL
ERYTHROCYTE [DISTWIDTH] IN BLOOD BY AUTOMATED COUNT: 15 % (ref 11.7–15.4)
HCT VFR BLD AUTO: 38.8 % (ref 34–46.6)
HDLC SERPL-MCNC: 63 MG/DL
HGB BLD-MCNC: 11.7 G/DL (ref 11.1–15.9)
IMP & REVIEW OF LAB RESULTS: NORMAL
LDLC SERPL CALC-MCNC: 110 MG/DL (ref 0–99)
Lab: NORMAL
MCH RBC QN AUTO: 26.5 PG (ref 26.6–33)
MCHC RBC AUTO-ENTMCNC: 30.2 G/DL (ref 31.5–35.7)
MCV RBC AUTO: 88 FL (ref 79–97)
MICROALBUMIN UR-MCNC: <3 UG/ML
PLATELET # BLD AUTO: 178 X10E3/UL (ref 150–450)
RBC # BLD AUTO: 4.42 X10E6/UL (ref 3.77–5.28)
TRIGL SERPL-MCNC: 98 MG/DL (ref 0–149)
VIT B12 SERPL-MCNC: 1191 PG/ML (ref 232–1245)
VLDLC SERPL CALC-MCNC: 18 MG/DL (ref 5–40)
WBC # BLD AUTO: 6.2 X10E3/UL (ref 3.4–10.8)

## 2025-08-20 DIAGNOSIS — E11.9 TYPE 2 DIABETES MELLITUS WITHOUT COMPLICATION, WITHOUT LONG-TERM CURRENT USE OF INSULIN (HCC): ICD-10-CM

## 2025-08-28 ENCOUNTER — PATIENT MESSAGE (OUTPATIENT)
Age: 60
End: 2025-08-28

## (undated) DEVICE — ELECTRODE,RADIOTRANSLUCENT,FOAM,3PK: Brand: MEDLINE

## (undated) DEVICE — 3M™ CUROS™ DISINFECTING CAP FOR NEEDLELESS CONNECTORS 270/CARTON 20 CARTONS/CASE CFF1-270: Brand: CUROS™

## (undated) DEVICE — CUFF RMFG BP INF SZ 11 DISP -- LAWSON OEM ITEM 238915

## (undated) DEVICE — 1200 GUARD II KIT W/5MM TUBE W/O VAC TUBE: Brand: GUARDIAN

## (undated) DEVICE — KIT COLON W/ 1.1OZ LUB AND 2 END

## (undated) DEVICE — Device

## (undated) DEVICE — CANN NASAL O2 CAPNOGRAPHY AD -- FILTERLINE

## (undated) DEVICE — (D)SENSOR RMFG 02 PULS OXMTR -- DISC BY MFR USE ITEM 133445

## (undated) DEVICE — SOLIDIFIER MEDC 1200ML -- CONVERT TO 356117

## (undated) DEVICE — CATH IV AUTOGRD BC PNK 20GA 25 -- INSYTE

## (undated) DEVICE — BAG BELONG PT PERS CLEAR HANDL

## (undated) DEVICE — SET ADMIN 16ML TBNG L100IN 2 Y INJ SITE IV PIGGY BK DISP

## (undated) DEVICE — SIMPLICITY FLUFF UNDERPAD 23X36, MODERATE: Brand: SIMPLICITY